# Patient Record
Sex: FEMALE | Race: WHITE | NOT HISPANIC OR LATINO | Employment: PART TIME | URBAN - METROPOLITAN AREA
[De-identification: names, ages, dates, MRNs, and addresses within clinical notes are randomized per-mention and may not be internally consistent; named-entity substitution may affect disease eponyms.]

---

## 2017-01-04 ENCOUNTER — TELEPHONE (OUTPATIENT)
Dept: SURGERY | Facility: CLINIC | Age: 42
End: 2017-01-04

## 2017-01-04 NOTE — TELEPHONE ENCOUNTER
Patient had gastric bypass 3/16.  She is calling today because she is wondering what her goal weight should be.    Discussed with patient that we don't set goal weights and her body will just eventually get to a set point and stop losing weight.  Asked patient what she thought her goal weight would be - she stated 140# which would put her at a 25.4 BMI.   She remembers her surgeon discussing a possible 150-160# possible weight goal which would put her at a 27.2-29 BMI.  She is currently at 158# a BMI of 28.7.  Any of those weights would be okay.  Discussed that she will attain the best weight loss by following the program.    She also had questions related to plastic surgery.  These were answered and list of plastic surgeons as well as information of body contouring were mailed to her.  She was encouraged to make appointment for her yearly follow up.   Cele Rubio, MS, RD, RN

## 2017-02-15 ENCOUNTER — TELEPHONE (OUTPATIENT)
Dept: SURGERY | Facility: CLINIC | Age: 42
End: 2017-02-15

## 2017-02-15 DIAGNOSIS — K91.2 POSTSURGICAL MALABSORPTION: ICD-10-CM

## 2017-02-15 DIAGNOSIS — Z98.84 BARIATRIC SURGERY STATUS: ICD-10-CM

## 2017-02-15 NOTE — TELEPHONE ENCOUNTER
Patient called to request orders be placed for her yearly appointment with Dr. Alvarez on 3/9/17.  She is 1 year PO gastric bypass.  Orders placed per protocol.  Patient notified.  Cele Rubio MS, RD, RN

## 2017-02-27 DIAGNOSIS — Z98.84 BARIATRIC SURGERY STATUS: ICD-10-CM

## 2017-02-27 DIAGNOSIS — K91.2 POSTSURGICAL MALABSORPTION: ICD-10-CM

## 2017-02-27 LAB
ERYTHROCYTE [DISTWIDTH] IN BLOOD BY AUTOMATED COUNT: 11.7 % (ref 10–15)
FERRITIN SERPL-MCNC: 184 NG/ML (ref 12–150)
HCT VFR BLD AUTO: 39.1 % (ref 35–47)
HGB BLD-MCNC: 13.5 G/DL (ref 11.7–15.7)
IRON SATN MFR SERPL: 40 % (ref 15–46)
IRON SERPL-MCNC: 107 UG/DL (ref 35–180)
MCH RBC QN AUTO: 33.1 PG (ref 26.5–33)
MCHC RBC AUTO-ENTMCNC: 34.5 G/DL (ref 31.5–36.5)
MCV RBC AUTO: 96 FL (ref 78–100)
PLATELET # BLD AUTO: 294 10E9/L (ref 150–450)
PTH-INTACT SERPL-MCNC: 35 PG/ML (ref 12–72)
RBC # BLD AUTO: 4.08 10E12/L (ref 3.8–5.2)
TIBC SERPL-MCNC: 266 UG/DL (ref 240–430)
VIT B12 SERPL-MCNC: 420 PG/ML (ref 193–986)
WBC # BLD AUTO: 6.8 10E9/L (ref 4–11)

## 2017-02-27 PROCEDURE — 83540 ASSAY OF IRON: CPT | Performed by: SURGERY

## 2017-02-27 PROCEDURE — 83550 IRON BINDING TEST: CPT | Performed by: SURGERY

## 2017-02-27 PROCEDURE — 85027 COMPLETE CBC AUTOMATED: CPT | Performed by: SURGERY

## 2017-02-27 PROCEDURE — 82607 VITAMIN B-12: CPT | Performed by: SURGERY

## 2017-02-27 PROCEDURE — 82728 ASSAY OF FERRITIN: CPT | Performed by: SURGERY

## 2017-02-27 PROCEDURE — 36415 COLL VENOUS BLD VENIPUNCTURE: CPT | Performed by: SURGERY

## 2017-02-27 PROCEDURE — 82306 VITAMIN D 25 HYDROXY: CPT | Performed by: SURGERY

## 2017-02-27 PROCEDURE — 83970 ASSAY OF PARATHORMONE: CPT | Performed by: SURGERY

## 2017-02-28 LAB — DEPRECATED CALCIDIOL+CALCIFEROL SERPL-MC: 63 UG/L (ref 20–75)

## 2017-03-06 ENCOUNTER — OFFICE VISIT (OUTPATIENT)
Dept: FAMILY MEDICINE | Facility: CLINIC | Age: 42
End: 2017-03-06
Payer: COMMERCIAL

## 2017-03-06 VITALS
HEIGHT: 62 IN | TEMPERATURE: 97.4 F | SYSTOLIC BLOOD PRESSURE: 124 MMHG | BODY MASS INDEX: 27.97 KG/M2 | HEART RATE: 78 BPM | DIASTOLIC BLOOD PRESSURE: 79 MMHG | WEIGHT: 152 LBS | OXYGEN SATURATION: 95 %

## 2017-03-06 DIAGNOSIS — L30.4 INTERTRIGO: Primary | ICD-10-CM

## 2017-03-06 DIAGNOSIS — Z98.84 S/P BARIATRIC SURGERY: ICD-10-CM

## 2017-03-06 DIAGNOSIS — L98.7 EXCESS SKIN OF ABDOMINAL WALL: ICD-10-CM

## 2017-03-06 PROCEDURE — 99213 OFFICE O/P EST LOW 20 MIN: CPT | Performed by: FAMILY MEDICINE

## 2017-03-06 RX ORDER — NYSTATIN 100000 [USP'U]/G
POWDER TOPICAL 3 TIMES DAILY PRN
Qty: 60 G | Refills: 1 | Status: SHIPPED | OUTPATIENT
Start: 2017-03-06 | End: 2018-03-12

## 2017-03-06 NOTE — NURSING NOTE
"Chief Complaint   Patient presents with     Derm Problem     skin irritation X 3 months       Initial /79 (BP Location: Left arm, Patient Position: Chair, Cuff Size: Adult Regular)  Pulse 78  Temp 97.4  F (36.3  C) (Oral)  Ht 5' 2\" (1.575 m)  Wt 152 lb (68.9 kg)  SpO2 95%  BMI 27.8 kg/m2 Estimated body mass index is 27.8 kg/(m^2) as calculated from the following:    Height as of this encounter: 5' 2\" (1.575 m).    Weight as of this encounter: 152 lb (68.9 kg).  Medication Reconciliation: complete   Kandi Carty MA      "

## 2017-03-06 NOTE — MR AVS SNAPSHOT
After Visit Summary   3/6/2017    Monica Mcgee    MRN: [id number]           Patient Information     Date Of Birth          1975        Visit Information        Provider Department      3/6/2017 10:30 AM Shaila Flores MD [facility]3] [place]        Today's Diagnoses     Intertrigo    -  1       Follow-ups after your visit        Follow-up notes from your care team     Return if symptoms worsen or fail to improve.      Your next 10 appointments already scheduled     Mar 09, 2017  1:30 PM CST   Annual Visit with Priscilla Ugalde 3, RD   [facility] ([facility])    [address] [place] [id number] [phone]            Mar 09, 2017  2:00 PM CST   Annual Visit with Antoine Alvarez MD   [facility] [place] ([facility])    [address] [place] [id number] [phone]              Who to contact     Normal or non-critical lab and imaging results will be communicated to you by FRESShart, letter or phone within 4 business days after the clinic has received the results. If you do not hear from us within 7 days, please contact the clinic through FRESShart or phone. If you have a critical or abnormal lab result, we will notify you by phone as soon as possible.  Submit refill requests through Socialinus or call your pharmacy and they will forward the refill request to us. Please allow 3 business days for your refill to be completed.          If you need to speak with a  for additional information , please call: [phone]             Additional Information About Your Visit        Socialinus Information     Socialinus gives you secure access to your electronic health record. If you see a primary care provider, you can also send messages to your care team and make appointments. If you have questions, please call your primary care  "clinic.  If you do not have a primary care provider, please call [phone] and they will assist you.        Care EveryWhere ID     This is your Care EveryWhere ID. This could be used by other organizations to access your [facility] medical records  [id number]        Your Vitals Were     Pulse Temperature Height Pulse Oximetry BMI (Body Mass Index)       78 97.4  F (36.3  C) (Oral) 5' 2\" (1.575 m) 95% 27.8 kg/m2        Blood Pressure from Last 3 Encounters:   03/06/17 124/79   09/12/16 122/86   06/23/16 117/80    Weight from Last 3 Encounters:   03/06/17 152 lb (68.9 kg)   09/12/16 177 lb (80.3 kg)   06/23/16 194 lb 12.8 oz (88.4 kg)              Today, you had the following     No orders found for display         Today's Medication Changes          These changes are accurate as of: 3/6/17 11:01 AM.  If you have any questions, ask your nurse or doctor.               Start taking these medicines.        Dose/Directions    nystatin 503470 UNIT/GM Powd   Commonly known as:  MYCOSTATIN   Used for:  Intertrigo   Started by:  Shaila Flores MD        Apply topically 3 times daily as needed   Quantity:  60 g   Refills:  1            Where to get your medicines      These medications were sent to [facility] - [address] [facility]  [address] [place] [address] [place] [address]     Phone:  [phone]     nystatin [id number] UNIT/GM Powd                Primary Care Provider Office Phone # Fax #    Shaila Flores [phone]       [facility] [address]  [place] [address]        Thank you!     Thank you for choosing [facility]  for your care. Our goal is always to provide you with excellent care. Hearing back from our patients is one way we can continue to improve our services. Please take a few minutes to complete the written survey that you may receive in the mail after your visit with us. Thank you!             Your Updated Medication List - " "Protect others around you: Learn how to safely use, store and throw away your medicines at www.disposemymeds.org.          This list is accurate as of: 3/6/17 11:01 AM.  Always use your most recent med list.                   Brand Name Dispense Instructions for use    CITRACAL +D3 PO      Take 600 mg by mouth 2 times daily       COLACE PO      Take by mouth 2 times daily       cyanocobalamin 1000 MCG/ML injection    VITAMIN B12    3 mL    Inject 1 mL (1,000 mcg) Subcutaneous every 30 days       etonogestrel 68 MG Impl    IMPLANON/NEXPLANON     1 each by Subdermal route once       multivitamin  peds with iron 60 MG chewable tablet      Take 2 chew tab by mouth daily (with lunch)       nystatin 910121 UNIT/GM Powd    MYCOSTATIN    60 g    Apply topically 3 times daily as needed       syringe/needle (disp) 25G X 1\" 3 ML Misc     3 each    Use for B-12 injection       VITAMIN D3 PO      Take 1,000 Units by mouth 2 times daily         "

## 2017-03-06 NOTE — PROGRESS NOTES
SUBJECTIVE:                                                    Monica Mcgee is a 41 year old female who presents to clinic today for the following health issues:        Rash     Onset: 3 months    Description:   Location: abdomen   Character: blotchy, odor  Itching (Pruritis): YES    Progression of Symptoms:  worsening    Accompanying Signs & Symptoms:  Fever: no   Body aches or joint pain: no   Sore throat symptoms: no   Recent cold symptoms: no    History:   Previous similar rash: YES    Precipitating factors:   Exposure to similar rash: no   New exposures: None   Recent travel: no     Alleviating factors:  none     Therapies Tried and outcome: none      Patient reports concern about excess abdominal skin after her bariatric surgery. Has lost about 80 lbs. States that her goal weight loss is 100 lbs.   Planning on an excess skin removal procedure in the near future      Problem list and histories reviewed & adjusted, as indicated.  Additional history: as documented    Patient Active Problem List   Diagnosis     Hypertension     Hyperlipidemia LDL goal <100     Type 2 diabetes mellitus without complication (H)     Dysthymic disorder     Nexplanon in place     Bariatric surgery status     External hemorrhoids with complication     Obesity (BMI 30-39.9)     Postsurgical malabsorption     Past Surgical History   Procedure Laterality Date      section  , 2001     x 2     Laparoscopic bypass gastric N/A 3/9/2016     Procedure: LAPAROSCOPIC BYPASS GASTRIC;  Surgeon: Antoine Alvarez MD;  Location:  OR     Laparoscopic herniorrhaphy hiatal N/A 3/9/2016     Procedure: LAPAROSCOPIC HERNIORRHAPHY HIATAL;  Surgeon: Antoine Alvarez MD;  Location:  OR       Social History   Substance Use Topics     Smoking status: Never Smoker     Smokeless tobacco: Not on file     Alcohol use No     Family History   Problem Relation Age of Onset     DIABETES Mother      Hypertension Mother       "Hyperlipidemia Mother      Depression Mother      Anxiety Disorder Mother      Obesity Mother      Breast Cancer Mother 42     Double mastectomy     DIABETES Father      Obesity Father      Anesthesia Reaction No family hx of          Current Outpatient Prescriptions   Medication Sig Dispense Refill     nystatin (MYCOSTATIN) 652820 UNIT/GM POWD Apply topically 3 times daily as needed 60 g 1     Docusate Sodium (COLACE PO) Take by mouth 2 times daily       cyanocobalamin (VITAMIN B12) 1000 MCG/ML injection Inject 1 mL (1,000 mcg) Subcutaneous every 30 days 3 mL 3     syringe/needle, disp, 25G X 1\" 3 ML MISC Use for B-12 injection 3 each 3     multivitamin  peds with iron (FLINTSTONES COMPLETE) 60 MG chewable tablet Take 2 chew tab by mouth daily (with lunch)        Cholecalciferol (VITAMIN D3 PO) Take 1,000 Units by mouth 2 times daily        Calcium-Phosphorus-Vitamin D (CITRACAL +D3 PO) Take 600 mg by mouth 2 times daily        etonogestrel (IMPLANON/NEXPLANON) 68 MG IMPL 1 each by Subdermal route once       No Known Allergies    Reviewed and updated as needed this visit by clinical staff  Tobacco  Allergies  Meds  Med Hx  Surg Hx  Fam Hx  Soc Hx      Reviewed and updated as needed this visit by Provider         ROS:  Constitutional, HEENT, cardiovascular, pulmonary, gi and gu systems are negative, except as otherwise noted.    OBJECTIVE:                                                    /79 (BP Location: Left arm, Patient Position: Chair, Cuff Size: Adult Regular)  Pulse 78  Temp 97.4  F (36.3  C) (Oral)  Ht 5' 2\" (1.575 m)  Wt 152 lb (68.9 kg)  SpO2 95%  BMI 27.8 kg/m2  Body mass index is 27.8 kg/(m^2).  GENERAL: healthy, alert and no distress  SKIN:  Excess skin of the abdominal wall. Intertrigo of the abdominal-inguinal fold. No skin breakdown.    Diagnostic Test Results:  none      ASSESSMENT/PLAN:                                                    Monica was seen today for derm " problem.    Diagnoses and all orders for this visit:    Intertrigo  -     nystatin (MYCOSTATIN) 461023 UNIT/GM POWD; Apply topically 3 times daily as needed    Excess skin of abdominal wall S/P bariatric surgery  Patient planning on an excess skin removal procedure in the near future.          Follow up if symptoms fail to improve or worsen.      The patient was in agreement with the plan today and had no questions or concerns prior to leaving the clinic.        Shaila Flores MD  [facility]

## 2017-03-09 ENCOUNTER — OFFICE VISIT (OUTPATIENT)
Dept: SURGERY | Facility: CLINIC | Age: 42
End: 2017-03-09
Payer: COMMERCIAL

## 2017-03-09 VITALS
OXYGEN SATURATION: 99 % | RESPIRATION RATE: 16 BRPM | WEIGHT: 153.2 LBS | BODY MASS INDEX: 28.02 KG/M2 | SYSTOLIC BLOOD PRESSURE: 129 MMHG | HEART RATE: 76 BPM | DIASTOLIC BLOOD PRESSURE: 87 MMHG

## 2017-03-09 DIAGNOSIS — Z98.84 BARIATRIC SURGERY STATUS: Primary | ICD-10-CM

## 2017-03-09 DIAGNOSIS — Z98.84 BARIATRIC SURGERY STATUS: ICD-10-CM

## 2017-03-09 PROCEDURE — 99214 OFFICE O/P EST MOD 30 MIN: CPT | Performed by: SURGERY

## 2017-03-09 PROCEDURE — 97803 MED NUTRITION INDIV SUBSEQ: CPT | Performed by: DIETITIAN, REGISTERED

## 2017-03-09 NOTE — MR AVS SNAPSHOT
MRN:[id number]                      After Visit Summary   3/9/2017    Monica Mcgee    MRN: [id number]           Visit Information        Provider Department      3/9/2017 1:30 PM 3, Sh William Ugalde RD [facility] [place] [facility]      MyChart Information     [employer] Medicalhart gives you secure access to your electronic health record. If you see a primary care provider, you can also send messages to your care team and make appointments. If you have questions, please call your primary care clinic.  If you do not have a primary care provider, please call [phone] and they will assist you.        Care EveryWhere ID     This is your Care EveryWhere ID. This could be used by other organizations to access your [facility] medical records  [id number]

## 2017-03-09 NOTE — LETTER
2017    RE:  Monica BANKSSean Ferchomelissa-:  [birth year]    Patient presents an annual follow-up after undergoing laparoscopic Halima-en-Y gastric bypass. She overall states that she is doing very well and she seems very pleased at this time. She continues to follow her lifestyle modifications with the exception of occasional snacking in between meals. He continues to not drink with her meals. She reports ongoing sense of restriction with meals. She denies nausea or vomiting. She denies episodes of food getting stuck. She is not aware of any specific foods that she is unable to tolerate. Her weight at today's visit was 153 pounds and her BMI was 28.02. Her home medications were reviewed. She basically is only taking her prescribed supplements which she states that she is taking on a regular and scheduled basis. Her only real ongoing difficulty is that of intertriginous skin fold rashes. As she has lost weight she has developed a modest amount of redundant lower abdominal skin resulting in Ongoing difficulties with rashes. She continues to treat this with Mycostatin powder. She is exercising on a regular basis.     On examination: Her abdomen is benign. She has a modest hanging pannus resulting in some intertriginous skinfold rash in the lower abdomen.. There is no evidence of cellulitis or panniculitis. There is no evidence of incisional hernia.     Overall this patient is doing exceptionally well. After undergoing her Halima-en-Y gastric bypass she has seen resolution of her prediabetes, acid reflux disease, hypertension, and Stress urinary incontinence. Basically all of her preoperative medical conditions relative to her morbid obesity have improved. I believe that she at some point in the future is going to need skin removal surgery due to these ongoing issues of skin fold rashes. I reviewed her yearly laboratories which all looked good. We discussed the necessity for ongoing lifestyle modification And adherence to her  dietary restrictions. This was all discussed with her in great detail. Her questions were all answered. I will see her again in annual follow-up in one year.     Antoine Alvarez MD

## 2017-03-09 NOTE — PROGRESS NOTES
"POST-OPERATIVE NUTRITION APPOINTMENT  DATE OF VISIT: 3/9/2017  Name: ALEX POPE  : 1975  Gender: Female  MRN: [id number]  Age: 41    ASSESSMENT:  REASON FOR VISIT:  ALEX POPE is a 41 year old Female presents today for 1 year PO nutrition follow-up appointment.  DIAGNOSIS:  Status post Laparoscopic Halima-en-Y Gastric Bypass surgery.  ANTHROPOMETRICS:  Height: 62.25 inches  Weight: 153 lbs  BMI: 27.8 kg/m2  VITAMINS AND MINERALS:  Multivitamin - 2 York's Complete, daily   Calcium - 600mg BID   Vitamin D - 2000 international units,daily   Vitamin B-12 - monthly injections   No iron needed per clinic guidelines   NUTRITION HISTORY:  Breakfast: hard boiled or fried egg with spinach and cheese, may skip breakfast 2x/week   Lunch: 2 slices of ham w/cream cheese (rolls), macadamia nuts   Supper: seafood fozia w/scallops, shrimp and mushrooms - uses shiratake noodles  Snacks: wasabi peas 1-2x/day, low carb ice cream   Beverages:water - 60-64oz  Consuming liquid calories: none  Protein intake: 50-60 grams/day  Tolerate regular texture food: Yes  Any foods not tolerated details: None  Portion size: 1 cup  Take 30 minutes to consume each meal: No  Eat protein foods first: Yes  Fluids and meals separate by at least 30 minutes: No  Chew foods 20 plus times: No  Tolerating diet: bariatric regular diet  Drinking high protein supplements/shakes: Yes  Consuming meals per day: 3  Consuming snacks per day: 1-2  Comment: Pt happy with weight loss but aims for an ultimate goal of 130 lbs. Discussed getting \"back to basics\" regarding dietary guidelines for optimal success.     PHYSICAL ACTIVITY:  Type: treadmill  Frequency: 2-3 times a week  Duration (min): 30  DIAGNOSIS:  Previous Nutrition Diagnosis: Altered gastrointestinal function related to alteration in gastrointestinal structure as evidenced by history of Laparoscopic Halima-en-Y Gastric Bypass surgery.  Unchanged, modified below.   Previous goals:  Add 2-3 " prunes daily into diet - not met   Add fruit and vegetable daily to meals - improving   Continue exercising 4 times per week -not met  Current Nutrition Diagnosis: Altered gastrointestinal function related to alteration in gastrointestinal structure as evidenced by history of Laparoscopic Halima-en-Y Gastric Bypass   Unchanged  INTERVENTION:  Nutrition Prescription: Eat 3 meals a day at regular intervals. Consume 60-90 grams of protein daily. Follow post-surgical vitamins and minerals protocol.  Goals:  Eliminate snacking   Do not exceed 1 cup per meal and limit high fat items like nuts, sauces and cheese.   Take 20 minutes to finish each meal   Separate fluids and meals by 30 minutes.   Exercise 4x/week.   Implementation: Discussed progress toward previous goals; reinforced importance of following bariatric lifestyle changes  NUTRITION MONITORING AND EVALUATION:  Anticipated compliance: Good  Verbalized understanding by discussing sticking to bariatric guidelines for long-term success.     Follow up: Continue to monitor pt closely regarding wt loss and diet,   Patient to follow up in 1 year.  TIME SPENT WITH PATIENT:  25 minutes  Shirley Dempsey RD, LD  Clinical Dietitian

## 2017-03-09 NOTE — MR AVS SNAPSHOT
After Visit Summary   3/9/2017    Monica Mcgee    MRN: [id number]           Patient Information     Date Of Birth          1975        Visit Information        Provider Department      3/9/2017 2:00 PM Antoine Alvarez MD [facility]      Today's Diagnoses     Bariatric surgery status    -  1    BMI 28.0-28.9,adult           Follow-ups after your visit        Who to contact     If you have questions or need follow up information about today's clinic visit or your schedule please contact [facility] directly at [phone].  Normal or non-critical lab and imaging results will be communicated to you by reportbrainhart, letter or phone within 4 business days after the clinic has received the results. If you do not hear from us within 7 days, please contact the clinic through reportbrainhart or phone. If you have a critical or abnormal lab result, we will notify you by phone as soon as possible.  Submit refill requests through Glassy Pro or call your pharmacy and they will forward the refill request to us. Please allow 3 business days for your refill to be completed.          Additional Information About Your Visit        MyChart Information     Gl[employer] Pro gives you secure access to your electronic health record. If you see a primary care provider, you can also send messages to your care team and make appointments. If you have questions, please call your primary care clinic.  If you do not have a primary care provider, please call [phone] and they will assist you.        Care EveryWhere ID     This is your Care EveryWhere ID. This could be used by other organizations to access your [facility] medical records  [id number]        Your Vitals Were     Pulse Respirations Pulse Oximetry BMI (Body Mass Index)          76 16 99% 28.02 kg/m2         Blood Pressure from Last 3 Encounters:   03/09/17 129/87  "  03/06/17 124/79   09/12/16 122/86    Weight from Last 3 Encounters:   03/09/17 153 lb 3.2 oz (69.5 kg)   03/06/17 152 lb (68.9 kg)   09/12/16 177 lb (80.3 kg)              We Performed the Following     OP ROOMING NOTE TO SHARLA        Primary Care Provider Office Phone # Fax #    Shaila Flores [phone]       [facility] [place] [address]  [place] [address]        Thank you!     Thank you for choosing [facility]  for your care. Our goal is always to provide you with excellent care. Hearing back from our patients is one way we can continue to improve our services. Please take a few minutes to complete the written survey that you may receive in the mail after your visit with us. Thank you!             Your Updated Medication List - Protect others around you: Learn how to safely use, store and throw away your medicines at www.disposemymeds.org.          This list is accurate as of: 3/9/17 11:59 PM.  Always use your most recent med list.                   Brand Name Dispense Instructions for use    CITRACAL +D3 PO      Take 600 mg by mouth 2 times daily       COLACE PO      Take by mouth 2 times daily       cyanocobalamin 1000 MCG/ML injection    VITAMIN B12    3 mL    Inject 1 mL (1,000 mcg) Subcutaneous every 30 days       etonogestrel 68 MG Impl    IMPLANON/NEXPLANON     1 each by Subdermal route once       FISH OIL PO          multivitamin  peds with iron 60 MG chewable tablet      Take 2 chew tab by mouth daily (with lunch)       nystatin [id number] UNIT/GM Powd    MYCOSTATIN    60 g    Apply topically 3 times daily as needed       syringe/needle (disp) 25G X 1\" 3 ML Misc     3 each    Use for B-12 injection       VITAMIN D3 PO      Take 1,000 Units by mouth 2 times daily         "

## 2017-03-16 NOTE — PROGRESS NOTES
Patient presents an annual follow-up after Undergoing laparoscopic Halima-en-Y gastric bypass.  She overall states that she is doing very well and she seems very pleased at this time.  She continues to follow her lifestyle modifications with the exception of occasional snacking in between meals.  He continues to not drink with her meals.  She reports ongoing sense of restriction with meals.  She denies nausea or vomiting.  She denies episodes of food getting stuck.  She is not aware of any specific foods that she is unable to tolerate.  Her weight at today's visit was 153 pounds and her BMI was 28.02. Her home medications were reviewed.  She basically is only taking her prescribed supplements which she states that she is taking on a regular and scheduled basis.   Her only real ongoing difficulty is that of intertriginous skin fold rashes.  As she has lost weight she has developed a modest amount of redundant lower abdominal skin resulting in  Ongoing difficulties with rashes.  She continues to treat this with Mycostatin powder. She is exercising on a regular basis.      On examination: Her abdomen is benign.  She has a modest hanging pannus resulting in some intertriginous skinfold rash in the lower abdomen..  There is no evidence of cellulitis or panniculitis. There is no evidence of incisional hernia.    Overall this patient is doing exceptionally well.  After undergoing her Halima-en-Y gastric bypass she has seen resolution of her prediabetes, acid reflux disease, hypertension, and Stress urinary incontinence. Basically all of her preoperative medical conditions relative to her morbid obesity have improved.  I believe that she at some point in the future is going to need skin removal surgery due to these ongoing issues of skin fold rashes. I reviewed her yearly laboratories which all looked good.   We discussed the necessity for ongoing lifestyle modification  And adherence to her dietary restrictions.  This was all  discussed with her in great Detail.  Her questions were all answered.  I will see her again in annual follow-up in one year.  Total time spent was 25 minutes with greater than 50% in face-to-face consultation.    Please route or send letter to:  Primary Care Provider (PCP) and Referring Provider

## 2017-03-27 ENCOUNTER — TELEPHONE (OUTPATIENT)
Dept: FAMILY MEDICINE | Facility: CLINIC | Age: 42
End: 2017-03-27

## 2017-03-27 DIAGNOSIS — L30.4 INTERTRIGO: Primary | ICD-10-CM

## 2017-03-27 NOTE — TELEPHONE ENCOUNTER
Nystain medication is not working for patient, would like to discuss alternatives, please call, okay to leave message.

## 2017-03-28 NOTE — TELEPHONE ENCOUNTER
Please clarify from patient: Does she have an active rash in her skin folds at this time or is it the body-odor only?

## 2017-03-28 NOTE — TELEPHONE ENCOUNTER
Pt states she does not have active red rash now , however the odor never goes away , she feels the powder just rubs of during her work out . She states she will wash area with soap and water , towel dry , and allow for a 15 min air dry .However the rash just comes back .She states she knows she needs removal but what can she do until then ?

## 2017-03-29 RX ORDER — KETOCONAZOLE 20 MG/G
CREAM TOPICAL 2 TIMES DAILY
Qty: 15 G | Refills: 0 | Status: SHIPPED | OUTPATIENT
Start: 2017-03-29 | End: 2018-03-12

## 2017-03-29 NOTE — TELEPHONE ENCOUNTER
It's difficult to treat the odor as it's from the trapped moisture from the skin folds.   We could try an antifungal cream to see if it will helps . Also could try a different OTC talcum powder to prevent moisture/wetness like the gold bond powder.  Rx sent.   Keep me updated on which works best.

## 2017-03-29 NOTE — TELEPHONE ENCOUNTER
Patient informed of RX and OTC powder.  Patient stated she will try the cream and will update us later on how cream is working.  Patient had no further concerns.

## 2017-03-29 NOTE — TELEPHONE ENCOUNTER
Left message on voice mail for patient to call clinic. [phone]  Shelley Glover RN

## 2017-03-30 DIAGNOSIS — E66.01 MORBID OBESITY WITH BMI OF 40.0-44.9, ADULT (H): Chronic | ICD-10-CM

## 2017-03-30 DIAGNOSIS — Z98.84 BARIATRIC SURGERY STATUS: ICD-10-CM

## 2017-03-30 RX ORDER — NEEDLES, FILTER 19GX1 1/2"
NEEDLE, DISPOSABLE MISCELLANEOUS
Qty: 3 EACH | Refills: 3 | Status: SHIPPED | OUTPATIENT
Start: 2017-03-30 | End: 2018-03-12

## 2017-03-30 RX ORDER — CYANOCOBALAMIN 1000 UG/ML
1 INJECTION, SOLUTION INTRAMUSCULAR; SUBCUTANEOUS
Qty: 3 ML | Refills: 3 | Status: SHIPPED | OUTPATIENT
Start: 2017-03-30 | End: 2018-03-12

## 2017-04-27 ENCOUNTER — TELEPHONE (OUTPATIENT)
Dept: FAMILY MEDICINE | Facility: CLINIC | Age: 42
End: 2017-04-27

## 2017-04-27 DIAGNOSIS — E65 ABDOMINAL PANNUS: ICD-10-CM

## 2017-04-27 DIAGNOSIS — L30.4 INTERTRIGO: Primary | ICD-10-CM

## 2017-04-27 NOTE — TELEPHONE ENCOUNTER
Patient notified and voiced understanding and agreement.  Referral number given.  Shelley Glover RN

## 2017-04-27 NOTE — TELEPHONE ENCOUNTER
Spoke with patient and she reports she has been using the ketoconazole cream, no powder at this time, and the cream is not helping.  The odor remains along with raised red bumps in area and this itches.  She reports this is the same as it has been, no new symptoms.  Please advise on next step.  Shaila Aburto RN, MD 3/27/17 telephone encounter  Note      It's difficult to treat the odor as it's from the trapped moisture from the skin folds.   We could try an antifungal cream to see if it will helps . Also could try a different OTC talcum powder to prevent moisture/wetness like the gold bond powder.  Rx sent.   Keep me updated on which works best.

## 2017-05-24 ENCOUNTER — TELEPHONE (OUTPATIENT)
Dept: SURGERY | Facility: CLINIC | Age: 42
End: 2017-05-24

## 2017-05-24 NOTE — TELEPHONE ENCOUNTER
"Patient is 14+ months PO gastric bypass.  She saw Dr. Alvarez 3/9/17 for her yearly PO visit.  At that time she complained of intertriginous skin fold rashes die to a modest amount of redundant lower abdominal skin from weight loss. This has resulted in ongoing difficulties with rashes.  She treats this with Mycostatin powder.  Her abdominal exam indicated: \"a modest hanging pannus resulting in some intertriginous skinfold rash in the lower abdomen\".  At that visit it was also discussed that she would \"need skin removal surgery due to these ongoing issues of skin fold rashes\".    Patient is calling today because she is requesting a referral to [facility] - either Dr. Nagy or Espinoza.   Informed her that we don't normally do this as there is no need; however I would be happy to let them know that  is recommending them.  Recommended patient keep record of visits to PCP and our clinic re: her skin breakdown and seek treatment.  Patient verbalized understanding and is agreeable to plan.  Cele Rubio MS, RD, RN      Called [facility].  Informed them re: above.  Was told they don't need patients name.  They will just get it when she calls and no referral needed.  Patient called and informed re: above and that [facility] no longer has an office in [place].  LM on patient's VM re: above.  Cele Rubio MS, RD, RN    "

## 2017-07-31 ENCOUNTER — TELEPHONE (OUTPATIENT)
Dept: FAMILY MEDICINE | Facility: CLINIC | Age: 42
End: 2017-07-31

## 2017-07-31 NOTE — TELEPHONE ENCOUNTER
Pt will call back to schedule. Call X1 week to check if she would like appointment if not already scheduled.

## 2017-07-31 NOTE — TELEPHONE ENCOUNTER
Panel Management Review      Patient has the following on her problem list:     Diabetes    ASA: Passed    Last A1C  Lab Results   Component Value Date    A1C 5.0 06/09/2016    A1C 6.8 03/10/2016    A1C 7.9 12/17/2015    A1C 6.1 05/27/2015    A1C 5.8 08/08/2014     A1C tested: Passed    Last LDL:    Lab Results   Component Value Date    CHOL 186 05/27/2015     Lab Results   Component Value Date    HDL 40 05/27/2015     Lab Results   Component Value Date     05/27/2015     Lab Results   Component Value Date    TRIG 145 05/27/2015     No results found for: CHOLHDLRATIO  No results found for: NHDL    Is the patient on a Statin? NO             Is the patient on Aspirin? NO        Last three blood pressure readings:  BP Readings from Last 3 Encounters:   03/09/17 129/87   03/06/17 124/79   09/12/16 122/86       Date of last diabetes office visit: 6/2016     Tobacco History:     History   Smoking Status     Never Smoker   Smokeless Tobacco     Not on file             Composite cancer screening  Chart review shows that this patient is due/due soon for the following Pap Smear  Summary:    Patient is due/failing the following:   A1C, FOLLOW UP diabetes, PAP and PHYSICAL    Action needed:   Patient needs office visit for annual physical with pap smear and fasting labs.    Type of outreach:    Phone, left message for patient to call back.     Questions for provider review:    None                                                                                                                                    Katerin Doss MA       Chart routed to Care Team .

## 2017-08-12 ENCOUNTER — HEALTH MAINTENANCE LETTER (OUTPATIENT)
Age: 42
End: 2017-08-12

## 2017-08-28 ENCOUNTER — TELEPHONE (OUTPATIENT)
Dept: FAMILY MEDICINE | Facility: CLINIC | Age: 42
End: 2017-08-28

## 2017-08-28 NOTE — TELEPHONE ENCOUNTER
Patient calling is scheduled for a phy and wants to add time for removal of her Implanon. Please call to advise.

## 2017-08-28 NOTE — TELEPHONE ENCOUNTER
Dr. Flores does not remove Implanons. Dr. Agbeh can remove. Left message on voicemail for patient to return call

## 2017-08-28 NOTE — TELEPHONE ENCOUNTER
Patient returned call, advised patient Dr. Flores does not remove Implanon. Patient scheduled appointment with Dr. Agbeh 9/14/17 for physical and removal of Implanon

## 2017-09-11 ENCOUNTER — OFFICE VISIT (OUTPATIENT)
Dept: FAMILY MEDICINE | Facility: CLINIC | Age: 42
End: 2017-09-11
Payer: COMMERCIAL

## 2017-09-11 VITALS
OXYGEN SATURATION: 100 % | HEART RATE: 66 BPM | WEIGHT: 152.6 LBS | SYSTOLIC BLOOD PRESSURE: 126 MMHG | DIASTOLIC BLOOD PRESSURE: 81 MMHG | HEIGHT: 62 IN | BODY MASS INDEX: 28.08 KG/M2 | TEMPERATURE: 98.6 F

## 2017-09-11 DIAGNOSIS — Z98.84 BARIATRIC SURGERY STATUS: ICD-10-CM

## 2017-09-11 DIAGNOSIS — E11.9 TYPE 2 DIABETES, DIET CONTROLLED (H): ICD-10-CM

## 2017-09-11 DIAGNOSIS — Z01.818 PREOP GENERAL PHYSICAL EXAM: Primary | ICD-10-CM

## 2017-09-11 DIAGNOSIS — L98.7 EXCESS SKIN: ICD-10-CM

## 2017-09-11 DIAGNOSIS — E65 ABDOMINAL PANNUS: ICD-10-CM

## 2017-09-11 DIAGNOSIS — Z13.220 LIPID SCREENING: ICD-10-CM

## 2017-09-11 PROCEDURE — 99214 OFFICE O/P EST MOD 30 MIN: CPT | Performed by: FAMILY MEDICINE

## 2017-09-11 NOTE — MR AVS SNAPSHOT
After Visit Summary   9/11/2017    Monica Mcgee    MRN: [id number]           Patient Information     Date Of Birth          1975        Visit Information        Provider Department      9/11/2017 4:00 PM Shaila Flores MD [facility]        Today's Diagnoses     Preop general physical exam    -  1    Bariatric surgery status        Abdominal pannus        Type 2 diabetes, diet controlled (H)          Care Instructions      Before Your Surgery      Call your surgeon if there is any change in your health. This includes signs of a cold or flu (such as a sore throat, runny nose, cough, rash or fever).    Do not smoke, drink alcohol or take over the counter medicine (unless your surgeon or primary care doctor tells you to) for the 24 hours before and after surgery.    If you take prescribed drugs: Follow your doctor s orders about which medicines to take and which to stop until after surgery.    Eating and drinking prior to surgery: follow the instructions from your surgeon    Take a shower or bath the night before surgery. Use the soap your surgeon gave you to gently clean your skin. If you do not have soap from your surgeon, use your regular soap. Do not shave or scrub the surgery site.  Wear clean pajamas and have clean sheets on your bed.           Follow-ups after your visit        Your next 10 appointments already scheduled     Sep 14, 2017  4:00 PM CDT   Office Visit with Cephas Mawuena Agbeh, MD   [facility] ([facility] [place])    [address] [facility] [id number] [phone]           Bring a current list of meds and any records pertaining to this visit. For Physicals, please bring immunization records and any forms needing to be filled out. Please arrive 10 minutes early to complete paperwork.              Future tests that were ordered for you today     Open Future Orders        Priority Expected Expires Ordered    CBC with platelets  "Routine  12/11/2017 9/11/2017    Basic metabolic panel  (Ca, Cl, CO2, Creat, Gluc, K, Na, BUN) Routine  12/11/2017 9/11/2017    Albumin Random Urine Quantitative with Creat Ratio Routine  12/11/2017 9/11/2017    **A1C FUTURE anytime Routine 9/11/2017 12/11/2017 9/11/2017    **TSH with free T4 reflex FUTURE anytime Routine 9/11/2017 12/11/2017 9/11/2017            Who to contact     Normal or non-critical lab and imaging results will be communicated to you by Examifyt, letter or phone within 4 business days after the clinic has received the results. If you do not hear from us within 7 days, please contact the clinic through [employer] or phone. If you have a critical or abnormal lab result, we will notify you by phone as soon as possible.  Submit refill requests through [employer] or call your pharmacy and they will forward the refill request to us. Please allow 3 business days for your refill to be completed.          If you need to speak with a  for additional information , please call: [phone]             Additional Information About Your Visit        [employer] Information     [employer] gives you secure access to your electronic health record. If you see a primary care provider, you can also send messages to your care team and make appointments. If you have questions, please call your primary care clinic.  If you do not have a primary care provider, please call [phone] and they will assist you.        Care EveryWhere ID     This is your Care EveryWhere ID. This could be used by other organizations to access your [place] medical records  [id number]        Your Vitals Were     Pulse Temperature Height Pulse Oximetry Breastfeeding? BMI (Body Mass Index)    66 98.6  F (37  C) (Tympanic) 5' 2\" (1.575 m) 100% No 27.91 kg/m2       Blood Pressure from Last 3 Encounters:   09/11/17 126/81   03/09/17 129/87   03/06/17 124/79    Weight from Last 3 Encounters:   09/11/17 152 lb 9.6 oz (69.2 kg)   03/09/17 " "153 lb 3.2 oz (69.5 kg)   03/06/17 152 lb (68.9 kg)               Primary Care Provider Office Phone # Fax #    Shaila Flores [phone]       [address] [place] [address]        Equal Access to Services     [facility]: Hadii aad ku hadasho Soomaali, waaxda luqadaha, qaybta kaalmada adeegyada, waxay idiin hayaan adeeg hilaria laanthonyn . So [place] [phone].    ATENCIÓN: Si habla español, tiene a dela cruz disposición servicios gratuitos de asistencia lingüística. [facility] [phone].    We comply with applicable federal civil rights laws and [place] laws. We do not discriminate on the basis of race, color, national origin, age, disability sex, sexual orientation or gender identity.            Thank you!     Thank you for choosing [facility]  for your care. Our goal is always to provide you with excellent care. Hearing back from our patients is one way we can continue to improve our services. Please take a few minutes to complete the written survey that you may receive in the mail after your visit with us. Thank you!             Your Updated Medication List - Protect others around you: Learn how to safely use, store and throw away your medicines at www.disposemymeds.org.          This list is accurate as of: 9/11/17  4:37 PM.  Always use your most recent med list.                   Brand Name Dispense Instructions for use Diagnosis    BD INTEGRA SYRINGE 25G X 1\" 3 ML Misc   Generic drug:  syringe/needle (disp)     3 each    USE FOR B-12 INJECTION    Morbid obesity with BMI of 40.0-44.9, adult (H), Bariatric surgery status       CITRACAL +D3 PO      Take 600 mg by mouth 2 times daily        COLACE PO      Take by mouth 2 times daily    Bariatric surgery status, Obesity (BMI 30.0-34.9)       cyanocobalamin 1000 MCG/ML injection    VITAMIN B12    3 mL    Inject 1 mL (1,000 mcg) Subcutaneous every 30 days    Morbid obesity with BMI of 40.0-44.9, adult (H), Bariatric surgery " status       etonogestrel 68 MG Impl    IMPLANON/NEXPLANON     1 each by Subdermal route once        FISH OIL PO           ketoconazole 2 % cream    NIZORAL    15 g    Apply topically 2 times daily    Intertrigo       multivitamin  peds with iron 60 MG chewable tablet      Take 2 chew tab by mouth daily (with lunch)        nystatin 970343 UNIT/GM Powd    MYCOSTATIN    60 g    Apply topically 3 times daily as needed    Intertrigo       VITAMIN D3 PO      Take 1,000 Units by mouth 2 times daily

## 2017-09-11 NOTE — NURSING NOTE
"Chief Complaint   Patient presents with     Pre-Op Exam       Initial /81  Pulse 66  Temp 98.6  F (37  C) (Tympanic)  Ht 5' 2\" (1.575 m)  Wt 152 lb 9.6 oz (69.2 kg)  SpO2 100%  Breastfeeding? No  BMI 27.91 kg/m2 Estimated body mass index is 27.91 kg/(m^2) as calculated from the following:    Height as of this encounter: 5' 2\" (1.575 m).    Weight as of this encounter: 152 lb 9.6 oz (69.2 kg).  Medication Reconciliation: complete       Katerin Doss MA      "

## 2017-09-11 NOTE — PROGRESS NOTES
[facility]  [address] [address] [facility]  [place] [address]  [phone]  Dept: [phone]    PRE-OP EVALUATION:  Today's date: 2017    Monica Mcgee (: 1975) presents for pre-operative evaluation assessment as requested by Dr. Bryan Parikh.  She requires evaluation and anesthesia risk assessment prior to undergoing surgery/procedure for treatment of Excess skin s/p bariatric surgery .  Proposed procedure: abdominal plasty, brachioplasty  and bilateral breast lift     Date of Surgery/ Procedure: 10/9/17  Time of Surgery/ Procedure: 8AM  Hospital/Surgical Facility: [facility]   Fax number for surgical facility: [phone]  Primary Physician: Shaila Flores  Type of Anesthesia Anticipated: General    Patient has a Health Care Directive or Living Will:  NO    1. NO - Do you have a history of heart attack, stroke, stent, bypass or surgery on an artery in the head, neck, heart or legs?  2. NO - Do you ever have any pain or discomfort in your chest?  3. NO - Do you have a history of  Heart Failure?  4. NO - Are you troubled by shortness of breath when: walking on the level, up a slight hill or at night?  5. NO - Do you currently have a cold, bronchitis or other respiratory infection?  6. NO - Do you have a cough, shortness of breath or wheezing?  7. NO - Do you sometimes get pains in the calves of your legs when you walk?  8. NO - Do you or anyone in your family have previous history of blood clots?  9. NO - Do you or does anyone in your family have a serious bleeding problem such as prolonged bleeding following surgeries or cuts?  10. NO - Have you ever had problems with anemia or been told to take iron pills?  11. NO - Have you had any abnormal blood loss such as black, tarry or bloody stools, or abnormal vaginal bleeding?  12. NO - Have you ever had a blood transfusion?  13. NO - Have you or any of your relatives ever had problems with anesthesia?  14. NO - Do you  have sleep apnea, excessive snoring or daytime drowsiness?  15. NO - Do you have any prosthetic heart valves?  16. NO - Do you have prosthetic joints?  17. NO - Is there any chance that you may be pregnant?        HPI:                                                      Brief HPI related to upcoming procedure:     42 year old pleasant female patient of mine here for a pre-op exam.     She is s/p bariatric surgery 18 months ago. Has lost about 91 lbs so far.   Now has excess skin in multiple areas- breast, upper arms and abdominal wall and wishes to proceed with an abdominoplasty, brachioplasty and breast lift.   States that she understands the risks and benefits of the procedure and wishes to proceed.     She has a history of type 2 diabetes that is now diet controlled since the bariatric surgery. Last A1C was 5.0 in 6/2016.  She is overdue for follow up labs to include lipid screening as well.     No other concerns today. No recent illnesses.       See problem list for active medical problems.  Problems all longstanding and stable, except as noted/documented.  See ROS for pertinent symptoms related to these conditions.                                                                                                  .    MEDICAL HISTORY:                                                    Patient Active Problem List    Diagnosis Date Noted     External hemorrhoids with complication 06/13/2016     Priority: Medium     Obesity (BMI 30-39.9) 06/13/2016     Priority: Medium     Postsurgical malabsorption 06/13/2016     Priority: Medium     Bariatric surgery status 03/16/2016     Priority: Medium     Nexplanon in place      Priority: Medium     Hyperlipidemia LDL goal <100 01/05/2016     Priority: Medium     Hypertension 07/31/2015     Priority: Medium     Type 2 diabetes mellitus without complication (H) 05/07/2014     Priority: Medium     Dysthymic disorder 05/07/2014     Priority: Medium      Past Medical History:  "  Diagnosis Date     Family history of breast cancer in mother      Hypertension      Morbid obesity (H)     in the process of undergoing bariatric surgery     Type 2 diabetes mellitus with hyperosmolarity without nonketotic hyperglycemic-hyperosmolar coma (nkhhc) (H)      Past Surgical History:   Procedure Laterality Date      SECTION  , 2001    x 2     LAPAROSCOPIC BYPASS GASTRIC N/A 3/9/2016    Procedure: LAPAROSCOPIC BYPASS GASTRIC;  Surgeon: Antoine Alvarez MD;  Location:  OR     LAPAROSCOPIC HERNIORRHAPHY HIATAL N/A 3/9/2016    Procedure: LAPAROSCOPIC HERNIORRHAPHY HIATAL;  Surgeon: Antoine Alvarez MD;  Location:  OR     Current Outpatient Prescriptions   Medication Sig Dispense Refill     BD INTEGRA SYRINGE 25G X 1\" 3 ML MISC USE FOR B-12 INJECTION 3 each 3     cyanocobalamin (VITAMIN B12) 1000 MCG/ML injection Inject 1 mL (1,000 mcg) Subcutaneous every 30 days 3 mL 3     Omega-3 Fatty Acids (FISH OIL PO)        Docusate Sodium (COLACE PO) Take by mouth 2 times daily       multivitamin  peds with iron (FLINTSTONES COMPLETE) 60 MG chewable tablet Take 2 chew tab by mouth daily (with lunch)        Cholecalciferol (VITAMIN D3 PO) Take 1,000 Units by mouth 2 times daily        Calcium-Phosphorus-Vitamin D (CITRACAL +D3 PO) Take 600 mg by mouth 2 times daily        etonogestrel (IMPLANON/NEXPLANON) 68 MG IMPL 1 each by Subdermal route once       ketoconazole (NIZORAL) 2 % cream Apply topically 2 times daily (Patient not taking: Reported on 2017) 15 g 0     nystatin (MYCOSTATIN) 348719 UNIT/GM POWD Apply topically 3 times daily as needed (Patient not taking: Reported on 2017) 60 g 1     OTC products: None, except as noted above    No Known Allergies   Latex Allergy: NO    Social History   Substance Use Topics     Smoking status: Never Smoker     Smokeless tobacco: Not on file     Alcohol use No     History   Drug Use No       REVIEW OF SYSTEMS:                               " "                     Constitutional, neuro, ENT, endocrine, pulmonary, cardiac, gastrointestinal, genitourinary, musculoskeletal, integument and psychiatric systems are negative, except as otherwise noted.      EXAM:                                                    /81  Pulse 66  Temp 98.6  F (37  C) (Tympanic)  Ht 5' 2\" (1.575 m)  Wt 152 lb 9.6 oz (69.2 kg)  SpO2 100%  Breastfeeding? No  BMI 27.91 kg/m2    GENERAL APPEARANCE: healthy, alert and no distress     EYES: EOMI, PERRL     HENT: ear canals and TM's normal and nose and mouth without ulcers or lesions     NECK: no adenopathy, no asymmetry, masses, or scars and thyroid normal to palpation     RESP: lungs clear to auscultation - no rales, rhonchi or wheezes     CV: regular rates and rhythm, normal S1 S2, no S3 or S4 and no murmur, click or rub     ABDOMEN:  soft, nontender, no HSM or masses and bowel sounds normal. Large abdominal pannus     MS: extremities normal- no gross deformities noted, no evidence of inflammation in joints, FROM in all extremities.     SKIN: no suspicious lesions or rashes     NEURO: Normal strength and tone, sensory exam grossly normal, mentation intact and speech normal     PSYCH: mentation appears normal. and affect normal/bright     LYMPHATICS: No axillary, cervical, or supraclavicular nodes    DIAGNOSTICS:                                                      Labs Drawn and in Process:   Unresulted Labs Ordered in the Past 30 Days of this Admission     No orders found from 7/13/2017 to 9/12/2017.          Recent Labs   Lab Test  02/27/17   1157  09/12/16   1425  06/09/16   1255  03/10/16   0537  03/09/16   1242  03/09/16   0635  02/16/16   1338  12/17/15   1102   HGB  13.5  14.0   --   11.5*   --    --   13.6  15.5   PLT  294  247   --    --   280   --   324  349   INR   --    --    --    --    --    --    --   0.98   NA   --    --    --   140   --    --   139  136   POTASSIUM   --    --    --   4.0   --   4.1  4.2  " 3.8   CR   --    --    --    --   0.79   --   0.76  0.82   A1C   --    --   5.0  6.8*   --    --    --   7.9*        IMPRESSION:                                                    Reason for surgery/procedure: Abdominal plasty, brachioplasty  and bilateral breast lift   Diagnosis/reason for consult: Excess skin, abdominal pannus s/p bariatric surgery    The proposed surgical procedure is considered INTERMEDIATE risk.    REVISED CARDIAC RISK INDEX  The patient has the following serious cardiovascular risks for perioperative complications such as (MI, PE, VFib and 3  AV Block):  No serious cardiac risks  INTERPRETATION: 0 risks: Class I (very low risk - 0.4% complication rate)    The patient has the following additional risks for perioperative complications:  No identified additional risks      ICD-10-CM    1. Preop general physical exam Z01.818 CBC with platelets     Basic metabolic panel  (Ca, Cl, CO2, Creat, Gluc, K, Na, BUN)   2. Bariatric surgery status Z98.84    3. Abdominal pannus E65    4. Excess skin L98.7    5. Type 2 diabetes, diet controlled (H) E11.9 Albumin Random Urine Quantitative with Creat Ratio     A1C FUTURE      TSH with free T4 reflex FUTURE anytime   6. Lipid screening Z13.220 Lipid Profile       RECOMMENDATIONS:                                                        Cardiovascular Risk  None identified      Pulmonary Risk  None identified      --Patient is to take all scheduled medications on the day of surgery EXCEPT for modifications listed below.    APPROVAL GIVEN to proceed with proposed procedure, without further diagnostic evaluation       Signed Electronically by: Shaila Flores MD    Copy of this evaluation report is provided to requesting physician.    [facility] Preop Guidelines

## 2017-09-14 ENCOUNTER — OFFICE VISIT (OUTPATIENT)
Dept: OBGYN | Facility: CLINIC | Age: 42
End: 2017-09-14
Payer: COMMERCIAL

## 2017-09-14 ENCOUNTER — TELEPHONE (OUTPATIENT)
Dept: FAMILY MEDICINE | Facility: CLINIC | Age: 42
End: 2017-09-14

## 2017-09-14 VITALS
SYSTOLIC BLOOD PRESSURE: 129 MMHG | DIASTOLIC BLOOD PRESSURE: 84 MMHG | HEART RATE: 76 BPM | WEIGHT: 152 LBS | BODY MASS INDEX: 27.97 KG/M2 | OXYGEN SATURATION: 98 % | TEMPERATURE: 97.6 F | HEIGHT: 62 IN

## 2017-09-14 DIAGNOSIS — Z30.430 ENCOUNTER FOR IUD INSERTION: ICD-10-CM

## 2017-09-14 DIAGNOSIS — Z01.419 ENCOUNTER FOR GYNECOLOGICAL EXAMINATION WITHOUT ABNORMAL FINDING: Primary | ICD-10-CM

## 2017-09-14 DIAGNOSIS — Z30.46 NEXPLANON REMOVAL: Primary | ICD-10-CM

## 2017-09-14 PROCEDURE — 99396 PREV VISIT EST AGE 40-64: CPT | Performed by: OBSTETRICS & GYNECOLOGY

## 2017-09-14 PROCEDURE — G0145 SCR C/V CYTO,THINLAYER,RESCR: HCPCS | Performed by: OBSTETRICS & GYNECOLOGY

## 2017-09-14 PROCEDURE — 82043 UR ALBUMIN QUANTITATIVE: CPT | Performed by: FAMILY MEDICINE

## 2017-09-14 PROCEDURE — 58300 INSERT INTRAUTERINE DEVICE: CPT | Mod: 59 | Performed by: OBSTETRICS & GYNECOLOGY

## 2017-09-14 PROCEDURE — 87624 HPV HI-RISK TYP POOLED RSLT: CPT | Performed by: OBSTETRICS & GYNECOLOGY

## 2017-09-14 PROCEDURE — 11982 REMOVE DRUG IMPLANT DEVICE: CPT | Mod: 59 | Performed by: OBSTETRICS & GYNECOLOGY

## 2017-09-14 ASSESSMENT — PATIENT HEALTH QUESTIONNAIRE - PHQ9: SUM OF ALL RESPONSES TO PHQ QUESTIONS 1-9: 2

## 2017-09-14 NOTE — MR AVS SNAPSHOT
After Visit Summary   9/14/2017    Monica Mcgee    MRN: [id number]           Patient Information     Date Of Birth          1975        Visit Information        Provider Department      9/14/2017 4:00 PM Agbeh, Cephas Mawuena, MD [facility]        Today's Diagnoses     Nexplanon removal    -  1    Encounter for IUD insertion           Follow-ups after your visit        Your next 10 appointments already scheduled     Sep 15, 2017  9:15 AM CDT   LAB with BE LAB   [place] [facility] ([facility] [place])    [address]  [place] [address] [phone]           Patient must bring picture ID. Patient should be prepared to give a urine specimen  Please do not eat 10-12 hours before your appointment if you are coming in fasting for labs on lipids, cholesterol, or glucose (sugar). Pregnant women should follow their Care Team instructions. Water with medications is okay. Do not drink coffee or other fluids. If you have concerns about taking  your medications, please ask at office or if scheduling via Peeridea, send a message by clicking on Secure Messaging, Message Your Care Team.              Future tests that were ordered for you today     Open Future Orders        Priority Expected Expires Ordered    *MA Screening Digital Bilateral Routine  9/14/2018 9/14/2017            Who to contact     If you have questions or need follow up information about today's clinic visit or your schedule please contact [place] [facility] directly at [phone].  Normal or non-critical lab and imaging results will be communicated to you by MyChart, letter or phone within 4 business days after the clinic has received the results. If you do not hear from us within 7 days, please contact the clinic through Maventhart or phone. If you have a critical or abnormal lab result, we will notify you by phone as soon as possible.  Submit refill requests through Peeridea or call your  pharmacy and they will forward the refill request to us. Please allow 3 business days for your refill to be completed.          Additional Information About Your Visit        MyChart Information     "[employer] gives you secure access to your electronic health record. If you see a primary care provider, you can also send messages to your care team and make appointments. If you have questions, please call your primary care clinic.  If you do not have a primary care provider, please call [phone] and they will assist you.        Care EveryWhere ID     This is your Care EveryWhere ID. This could be used by other organizations to access your [facility] medical records  [id number]        Your Vitals Were     Last Period                   08/23/2017            Blood Pressure from Last 3 Encounters:   09/14/17 129/84   09/11/17 126/81   03/09/17 129/87    Weight from Last 3 Encounters:   09/14/17 152 lb (68.9 kg)   09/11/17 152 lb 9.6 oz (69.2 kg)   03/09/17 153 lb 3.2 oz (69.5 kg)              We Performed the Following     C KYLEENA IUD 19.5 MG     INSERTION INTRAUTERINE DEVICE     REMOVAL NON-BIODEGRADABLE DRUG DELIVERY IMPLANT        Primary Care Provider Office Phone # Fax #    Shaila Flores [phone]       [address] [place] [address]        Equal Access to Services     [facility]: Hadii aad ku hadasho Soomaali, waaxda luqadaha, qaybta kaalmada adeegyada, rolan bartlett haymarelne jones . So [facility] [phone].    ATENCIÓN: Si habla español, tiene a dela cruz disposición servicios gratuitos de asistencia lingüística. Llame al [phone].    We comply with applicable federal civil rights laws and [place] laws. We do not discriminate on the basis of race, color, national origin, age, disability sex, sexual orientation or gender identity.            Thank you!     Thank you for choosing [facility]  for your care. Our goal is always to provide you with excellent care.  "Hearing back from our patients is one way we can continue to improve our services. Please take a few minutes to complete the written survey that you may receive in the mail after your visit with us. Thank you!             Your Updated Medication List - Protect others around you: Learn how to safely use, store and throw away your medicines at www.disposemymeds.org.          This list is accurate as of: 9/14/17  5:07 PM.  Always use your most recent med list.                   Brand Name Dispense Instructions for use Diagnosis    BD INTEGRA SYRINGE 25G X 1\" 3 ML Misc   Generic drug:  syringe/needle (disp)     3 each    USE FOR B-12 INJECTION    Morbid obesity with BMI of 40.0-44.9, adult (H), Bariatric surgery status       CITRACAL +D3 PO      Take 600 mg by mouth 2 times daily        COLACE PO      Take by mouth 2 times daily    Bariatric surgery status, Obesity (BMI 30.0-34.9)       cyanocobalamin 1000 MCG/ML injection    VITAMIN B12    3 mL    Inject 1 mL (1,000 mcg) Subcutaneous every 30 days    Morbid obesity with BMI of 40.0-44.9, adult (H), Bariatric surgery status       etonogestrel 68 MG Impl    IMPLANON/NEXPLANON     1 each by Subdermal route once        FISH OIL PO           ketoconazole 2 % cream    NIZORAL    15 g    Apply topically 2 times daily    Intertrigo       multivitamin  peds with iron 60 MG chewable tablet      Take 2 chew tab by mouth daily (with lunch)        nystatin 258409 UNIT/GM Powd    MYCOSTATIN    60 g    Apply topically 3 times daily as needed    Intertrigo       VITAMIN D3 PO      Take 1,000 Units by mouth 2 times daily          "

## 2017-09-14 NOTE — NURSING NOTE
"Chief Complaint   Patient presents with     Gyn Exam     nexplanon removal       Initial There were no vitals taken for this visit. Estimated body mass index is 27.8 kg/(m^2) as calculated from the following:    Height as of an earlier encounter on 9/14/17: 5' 2\" (1.575 m).    Weight as of an earlier encounter on 9/14/17: 152 lb (68.9 kg).  Medication Reconciliation: complete     Kassidy Jara LPN    "

## 2017-09-14 NOTE — LETTER
September 22, 2017    Monica Mcgee  [address] [place] [address]    Dear Monica,  We are happy to inform you that your PAP smear result from 9/14/17 is normal.  We are now able to do a follow up test on PAP smears. The DNA test is for HPV (Human Papilloma Virus). Cervical cancer is closely linked with certain types of HPV. Your result showed no evidence of high risk HPV.  Therefore we recommend you return in 3 years for your next pap smear.  You will still need to return to the clinic every year for an annual exam and other preventive tests.  Please contact the clinic at [phone] with any questions.  Sincerely,    Cephas Mawuena Agbeh, MD/sajan

## 2017-09-14 NOTE — PROGRESS NOTES
Monica is a 42 year old  here for annual exam.   She had gastric bypass and lost over 90 lbs. She is due to have cosmetic surgery to correct the excess loose skin and breast surgery.Needs Nexplanon removed for the surgery. She wants KYLEENA IUD placed.    ROS: Ten point review of systems was reviewed and negative except the above.    Health Maintenance   Topic Date Due     EYE EXAM Q1 YEAR  1976     PNEUMOVAX 1X HI RISK PATIENT < 65 (NO IB MSG)  1978     LIPID MONITORING Q1 YEAR  2016     A1C Q6 MO  2016     MICROALBUMIN Q1 YEAR  2017     CREATININE Q1 YEAR  2017     PAP Q3 YR  2017     FOOT EXAM Q1 YEAR  2017     TSH W/ FREE T4 REFLEX Q2 YEAR  2017     INFLUENZA VACCINE (SYSTEM ASSIGNED)  2017     TETANUS IMMUNIZATION (SYSTEM ASSIGNED)  2026      Last pap:   Last Mammogram:   Last Dexa: none  Last Colonoscopy: none  Lab Results   Component Value Date    CHOL 186 2015     Lab Results   Component Value Date    HDL 40 2015     Lab Results   Component Value Date     2015     Lab Results   Component Value Date    TRIG 145 2015     No results found for: CHOLHDLRATIO      OBHX:    Obstetric History       T0      L2     SAB2   TAB0   Ectopic0   Multiple0   Live Births2       # Outcome Date GA Lbr Branden/2nd Weight Sex Delivery Anes PTL Lv   4       -SEC   KELLEY   3       -SEC   KELLEY   2 TAB            1 TAB                   Past Surgical History:   Procedure Laterality Date      SECTION  , 2001    x 2     LAPAROSCOPIC BYPASS GASTRIC N/A 3/9/2016    Procedure: LAPAROSCOPIC BYPASS GASTRIC;  Surgeon: Antoine Alvarez MD;  Location:  OR     LAPAROSCOPIC HERNIORRHAPHY HIATAL N/A 3/9/2016    Procedure: LAPAROSCOPIC HERNIORRHAPHY HIATAL;  Surgeon: Antoine Alvarez MD;  Location:  OR       [facility]: Her past medical, surgical, and obstetric histories were  "reviewed and are documented in their appropriate chart areas.    ALL/Meds: Her medication and allergy histories were reviewed and are documented in their appropriate chart areas.    SH/FMH: Her social and family history was reviewed and documented in its appropriate chart area.    PE: /84 (BP Location: Left arm, Patient Position: Chair, Cuff Size: Adult Regular)  Pulse 76  Temp 97.6  F (36.4  C) (Tympanic)  Ht 5' 2\" (1.575 m)  Wt 152 lb (68.9 kg)  LMP 08/23/2017  SpO2 98%  BMI 27.8 kg/m2  Body mass index is 27.8 kg/(m^2).    General Appearance:  healthy, alert, active, no distress  Cardiovascular:  Regular rate and Rhythm  Neck: Supple, no adenopathy and thyroid normal  Lungs:  Clear, without wheeze, rale or rhonchi  Breast: normal breast exam  Abdomen: Benign, Soft, flat, non-tender, No masses, organomegaly, No inguinal nodes and Bowel sounds normoactiveSoft, nontender.   Pelvic:       - Ext: Vulva and perineum are normal without lesion, mass or discharge        - Urethra: normal without discharge or scarring  hypermobility       - Urethral Meatus: normal appearance,        - Bladder: no tenderness, no masses       - Vagina: Normal mucosa, no discharge     rugated       - Cervix: nulliparous       - Uterus:Normal shape, position and consistencyfirm, nontender, nongravid uterus without CMT       - Adnexa: Normal without masses or tenderness       - Rectal: deferred    A/P:  Well Woman,     ICD-10-CM    1. Encounter for gynecological examination without abnormal finding Z01.419 Pap imaged thin layer screen with HPV - recommended age 30 - 65 years (select HPV order below)     HPV High Risk Types DNA Cervical     *MA Screening Digital Bilateral        - Encouraged self-breast exam   - Encouraged low fat diet, regular exercise, and adequate calcium intake.    CEPHAS AGBEH, MD.       "

## 2017-09-14 NOTE — PROGRESS NOTES
Monica Mcgee is a 42 year old female  Patient's last menstrual period was 2017. .After 2 years of Implanon as a contraceptive, she was here today for its removal    LMP 2017    NONE    Monica was couseled about removal. She voiced her understanding and informed consent was obtained.    Patient was placed in a supine position. Left arm was flexed at the elbow and externally rotated. The implant was located by palpation and marked at the end closest to the elbow with a sterile marker. The implant was palpated by both myself and the patient.      The area was cleaned and antiseptic applied. I injected sufficient anesthetic, 1cc of 1% Lidocaine just underneath the end of the implant closest to the elbow.  I pressed down on the end of the implant closest to the axilla and made a 3 mm incision in the longitudinal direction  Of the arm at the tip of the implant closest to the elbow. I gently pushed the implant toward the incision until the tip was visible and grasped the implant with steril mosquito foreceps and gently removed it.   The incision was closed with a butterfly closure and an adhesive bandage appied. A sterile gauze with pressure bandage was also applied.    Asceptic conditions were maintained throughout the procedure. The patient tolerated the procedure well.  No apparent complications.    CEPHAS AGBEH, MD.      Monica Mcgee is a 42 year old  who presents today requesting placement of an KYLEENA iud.    The patient meets and is agreeable to the following conditions:  She is not interested in conception in the near future.   She currently is in a stable, monogamous relationship.   There is no previous history of pelvic inflammatory disease.   There is no previous history of ectopic pregnancy.   She is willing to check monthly for the IUD string.   There is no history of unresolved abnormal uterine bleeding.   There is no history of an unresolved abnormal PAP smear.   She has no  history of diabetes, AIDS, leukemia, IV drug use or chronic steroid use.   She is willing to return annually for PAP smears.   She has had a PAP smear within the past 6 months.     We discussed risks, benefits, and alternatives including but not limited to:   Possibility of pregnancy and ectopic pregnancy.  Possibility of pelvic inflammatory disease, particularly with new partners.  Risk of uterine perforation or IUD expulsion.  Possibility of difficult removal.  Spotting or heavy bleeding.  Cramping, pain or infection during or after insertion.    The patient was given patient information on the IUD and the patient education brochure from the .  The patient has given consent to proceed with placement of the IUD.  She wishes to proceed.  All questions answered.      PROCEDURE:  Type of IUD: KYLEENA    She is placed in a dorsal lithotomy potion and a pelvic exam is performed to determine the position of the uterus.  The cervix is identified and cleaned with betadine.  A single tooth tenaculum is applied to the anterior lip of the cervix for stabilization. Cervical block with 10 cc lidocaine 1%.  The uterus was dilated and sounded to 7.0 cm. (Target sound depth is 6.5 cm to 8.5 cm.)  The IUD insertion tube is prepared to manufacturers recommendations and inserted into the uterus under sterile conditions in the usual fashion.  The IUD string is then cut to 4.0 cm.    The patient tolerated this procedure without immediate complication.  The patient is to return or call immediately for any unexplained fever, abdominal or pelvic pain, excessive bleeding, possibility of pregnancy, foul-smelling discharge, sense that the IUD has been expelled.  All questions were answered.    Return to clinic in 1 month for IUD check        ICD-10-CM    1. Nexplanon removal Z30.46 REMOVAL NON-BIODEGRADABLE DRUG DELIVERY IMPLANT   2. Encounter for IUD insertion Z30.430 INSERTION INTRAUTERINE DEVICE     C KYLEENA IUD 19.5 MG        CEPHAS AGBEH, MD.

## 2017-09-14 NOTE — NURSING NOTE
"Chief Complaint   Patient presents with     Physical       Initial /84 (BP Location: Left arm, Patient Position: Chair, Cuff Size: Adult Regular)  Pulse 76  Temp 97.6  F (36.4  C) (Tympanic)  Ht 5' 2\" (1.575 m)  Wt 152 lb (68.9 kg)  SpO2 98%  BMI 27.8 kg/m2 Estimated body mass index is 27.8 kg/(m^2) as calculated from the following:    Height as of this encounter: 5' 2\" (1.575 m).    Weight as of this encounter: 152 lb (68.9 kg).  Medication Reconciliation: complete     Kassidy Jara LPN    "

## 2017-09-14 NOTE — MR AVS SNAPSHOT
After Visit Summary   9/14/2017    Monica Mcgee    MRN: [id number]           Patient Information     Date Of Birth          1975        Visit Information        Provider Department      9/14/2017 4:30 PM Agbeh, Cephas Mawuena, MD [facility]        Today's Diagnoses     Encounter for gynecological examination without abnormal finding    -  1       Follow-ups after your visit        Your next 10 appointments already scheduled     Sep 15, 2017  9:15 AM CDT   LAB with BE LAB   [place] [facility] ([facility] [place])    [address] [place] [address] [phone]           Patient must bring picture ID. Patient should be prepared to give a urine specimen  Please do not eat 10-12 hours before your appointment if you are coming in fasting for labs on lipids, cholesterol, or glucose (sugar). Pregnant women should follow their Care Team instructions. Water with medications is okay. Do not drink coffee or other fluids. If you have concerns about taking  your medications, please ask at office or if scheduling via [employer], send a message by clicking on Secure Messaging, Message Your Care Team.              Future tests that were ordered for you today     Open Future Orders        Priority Expected Expires Ordered    *MA Screening Digital Bilateral Routine  9/14/2018 9/14/2017            Who to contact     If you have questions or need follow up information about today's clinic visit or your schedule please contact [place] [facility] directly at [phone].  Normal or non-critical lab and imaging results will be communicated to you by [employer]eralytics[employer], letter or phone within 4 business days after the clinic has received the results. If you do not hear from us within 7 days, please contact the clinic through Teral[employer] or phone. If you have a critical or abnormal lab result, we will notify you by phone as soon as possible.  Submit refill requests through Ideatory or  "call your pharmacy and they will forward the refill request to us. Please allow 3 business days for your refill to be completed.          Additional Information About Your Visit        MyChart Information     [employer] gives you secure access to your electronic health record. If you see a primary care provider, you can also send messages to your care team and make appointments. If you have questions, please call your primary care clinic.  If you do not have a primary care provider, please call [phone] and they will assist you.        Care EveryWhere ID     This is your Care EveryWhere ID. This could be used by other organizations to access your [facility] medical records  [id number]        Your Vitals Were     Pulse Temperature Height Last Period Pulse Oximetry BMI (Body Mass Index)    76 97.6  F (36.4  C) (Tympanic) 5' 2\" (1.575 m) 08/23/2017 98% 27.8 kg/m2       Blood Pressure from Last 3 Encounters:   09/14/17 129/84   09/11/17 126/81   03/09/17 129/87    Weight from Last 3 Encounters:   09/14/17 152 lb (68.9 kg)   09/11/17 152 lb 9.6 oz (69.2 kg)   03/09/17 153 lb 3.2 oz (69.5 kg)              We Performed the Following     HPV High Risk Types DNA Cervical     Pap imaged thin layer screen with HPV - recommended age 30 - 65 years (select HPV order below)        Primary Care Provider Office Phone # Fax #    Shaila Flores [phone]       [address]  [place] [address]        Equal Access to Services     [facility] : Hadii ricardo ku hadasho Soomaali, waaxda luqadaha, qaybta kaalmada adeegnasima, rolan jones . So [facility] [phone].    ATENCIÓN: Si habla español, tiene a dela cruz disposición servicios gratuitos de asistencia lingüística. Llame al [phone].    We comply with applicable federal civil rights laws and [place] laws. We do not discriminate on the basis of race, color, national origin, age, disability sex, sexual orientation or gender identity.       " "     Thank you!     Thank you for choosing [facility]  for your care. Our goal is always to provide you with excellent care. Hearing back from our patients is one way we can continue to improve our services. Please take a few minutes to complete the written survey that you may receive in the mail after your visit with us. Thank you!             Your Updated Medication List - Protect others around you: Learn how to safely use, store and throw away your medicines at www.disposemymeds.org.          This list is accurate as of: 9/14/17  4:57 PM.  Always use your most recent med list.                   Brand Name Dispense Instructions for use Diagnosis    BD INTEGRA SYRINGE 25G X 1\" 3 ML Misc   Generic drug:  syringe/needle (disp)     3 each    USE FOR B-12 INJECTION    Morbid obesity with BMI of 40.0-44.9, adult (H), Bariatric surgery status       CITRACAL +D3 PO      Take 600 mg by mouth 2 times daily        COLACE PO      Take by mouth 2 times daily    Bariatric surgery status, Obesity (BMI 30.0-34.9)       cyanocobalamin 1000 MCG/ML injection    VITAMIN B12    3 mL    Inject 1 mL (1,000 mcg) Subcutaneous every 30 days    Morbid obesity with BMI of 40.0-44.9, adult (H), Bariatric surgery status       etonogestrel 68 MG Impl    IMPLANON/NEXPLANON     1 each by Subdermal route once        FISH OIL PO           ketoconazole 2 % cream    NIZORAL    15 g    Apply topically 2 times daily    Intertrigo       multivitamin  peds with iron 60 MG chewable tablet      Take 2 chew tab by mouth daily (with lunch)        nystatin [id number] UNIT/GM Powd    MYCOSTATIN    60 g    Apply topically 3 times daily as needed    Intertrigo       VITAMIN D3 PO      Take 1,000 Units by mouth 2 times daily          "

## 2017-09-15 DIAGNOSIS — E11.9 TYPE 2 DIABETES, DIET CONTROLLED (H): ICD-10-CM

## 2017-09-15 DIAGNOSIS — Z13.220 LIPID SCREENING: ICD-10-CM

## 2017-09-15 DIAGNOSIS — Z01.818 PREOP GENERAL PHYSICAL EXAM: ICD-10-CM

## 2017-09-15 LAB
ANION GAP SERPL CALCULATED.3IONS-SCNC: 8 MMOL/L (ref 3–14)
BUN SERPL-MCNC: 17 MG/DL (ref 7–30)
CALCIUM SERPL-MCNC: 9 MG/DL (ref 8.5–10.1)
CHLORIDE SERPL-SCNC: 105 MMOL/L (ref 94–109)
CHOLEST SERPL-MCNC: 143 MG/DL
CO2 SERPL-SCNC: 27 MMOL/L (ref 20–32)
CREAT SERPL-MCNC: 0.72 MG/DL (ref 0.52–1.04)
CREAT UR-MCNC: 159 MG/DL
ERYTHROCYTE [DISTWIDTH] IN BLOOD BY AUTOMATED COUNT: 11.3 % (ref 10–15)
GFR SERPL CREATININE-BSD FRML MDRD: 89 ML/MIN/1.7M2
GLUCOSE SERPL-MCNC: 93 MG/DL (ref 70–99)
HBA1C MFR BLD: 4.9 % (ref 4.3–6)
HCT VFR BLD AUTO: 41.3 % (ref 35–47)
HDLC SERPL-MCNC: 65 MG/DL
HGB BLD-MCNC: 14.4 G/DL (ref 11.7–15.7)
LDLC SERPL CALC-MCNC: 63 MG/DL
MCH RBC QN AUTO: 33 PG (ref 26.5–33)
MCHC RBC AUTO-ENTMCNC: 34.9 G/DL (ref 31.5–36.5)
MCV RBC AUTO: 95 FL (ref 78–100)
MICROALBUMIN UR-MCNC: 7 MG/L
MICROALBUMIN/CREAT UR: 4.6 MG/G CR (ref 0–25)
NONHDLC SERPL-MCNC: 78 MG/DL
PLATELET # BLD AUTO: 248 10E9/L (ref 150–450)
POTASSIUM SERPL-SCNC: 4.3 MMOL/L (ref 3.4–5.3)
RBC # BLD AUTO: 4.36 10E12/L (ref 3.8–5.2)
SODIUM SERPL-SCNC: 140 MMOL/L (ref 133–144)
TRIGL SERPL-MCNC: 76 MG/DL
TSH SERPL DL<=0.005 MIU/L-ACNC: 2.2 MU/L (ref 0.4–4)
WBC # BLD AUTO: 7 10E9/L (ref 4–11)

## 2017-09-15 PROCEDURE — 80048 BASIC METABOLIC PNL TOTAL CA: CPT | Performed by: FAMILY MEDICINE

## 2017-09-15 PROCEDURE — 84443 ASSAY THYROID STIM HORMONE: CPT | Performed by: FAMILY MEDICINE

## 2017-09-15 PROCEDURE — 85027 COMPLETE CBC AUTOMATED: CPT | Performed by: FAMILY MEDICINE

## 2017-09-15 PROCEDURE — 83036 HEMOGLOBIN GLYCOSYLATED A1C: CPT | Performed by: FAMILY MEDICINE

## 2017-09-15 PROCEDURE — 36415 COLL VENOUS BLD VENIPUNCTURE: CPT | Performed by: FAMILY MEDICINE

## 2017-09-15 PROCEDURE — 80061 LIPID PANEL: CPT | Performed by: FAMILY MEDICINE

## 2017-09-15 NOTE — TELEPHONE ENCOUNTER
Pre op form completed in [facility]. Pre op faxed to [phone]. [facility]

## 2017-09-19 LAB
COPATH REPORT: NORMAL
PAP: NORMAL

## 2017-09-21 LAB
FINAL DIAGNOSIS: NORMAL
HPV HR 12 DNA CVX QL NAA+PROBE: NEGATIVE
HPV16 DNA SPEC QL NAA+PROBE: NEGATIVE
HPV18 DNA SPEC QL NAA+PROBE: NEGATIVE
SPECIMEN DESCRIPTION: NORMAL

## 2017-09-26 NOTE — TELEPHONE ENCOUNTER
Panel Management Review      Patient has the following on her problem list:     Diabetes    ASA: Passed    Last A1C  Lab Results   Component Value Date    A1C 4.9 09/15/2017    A1C 5.0 06/09/2016    A1C 6.8 03/10/2016    A1C 7.9 12/17/2015    A1C 6.1 05/27/2015     A1C tested: Passed    Last LDL:    Lab Results   Component Value Date    CHOL 143 09/15/2017     Lab Results   Component Value Date    HDL 65 09/15/2017     Lab Results   Component Value Date    LDL 63 09/15/2017     Lab Results   Component Value Date    TRIG 76 09/15/2017     No results found for: CHOLHDLRATIO  Lab Results   Component Value Date    NHDL 78 09/15/2017       Is the patient on a Statin? YES             Is the patient on Aspirin? YES        Last three blood pressure readings:  BP Readings from Last 3 Encounters:   09/14/17 129/84   09/11/17 126/81   03/09/17 129/87       Date of last diabetes office visit: 9/15/17- A1C WAS COMPLETED AND PASSING     Tobacco History:     History   Smoking Status     Never Smoker   Smokeless Tobacco     Not on file             Composite cancer screening  Chart review shows that this patient is due/due soon for the following None  Summary:    Patient is due/failing the following:   NONE    Action needed:   NONE    Type of outreach:NONENONE    Questions for provider review:    None                                                                                                                                    Katerin Doss MA       Chart routed to Care Team .

## 2018-02-27 ENCOUNTER — TELEPHONE (OUTPATIENT)
Dept: SURGERY | Facility: CLINIC | Age: 43
End: 2018-02-27

## 2018-02-27 DIAGNOSIS — K91.2 POSTSURGICAL MALABSORPTION: ICD-10-CM

## 2018-02-27 DIAGNOSIS — Z98.84 BARIATRIC SURGERY STATUS: ICD-10-CM

## 2018-02-27 NOTE — TELEPHONE ENCOUNTER
She is coming in for annual weight loss appointment 3/12/18  Wondering if there are any labs that need to be done prior to  That appointment, if so she would like orders put in the system  So she can go do that today as she has a day off  Please call her to let her know    Phone:  [phone]  Ok to leave message

## 2018-03-06 NOTE — TELEPHONE ENCOUNTER
Patient calling back, wondering if labs are needed before appointment on 03/12/18  Left message 2/27/18, but did not get a call back   Ph:  [phone]  Ok to leave a message

## 2018-03-06 NOTE — TELEPHONE ENCOUNTER
Orders placed per standard protocol.  Patient has been seen in clinic for every follow up since surgery in 2016.  Patient notified.  Cele Rubio MS, RD, RN

## 2018-03-12 ENCOUNTER — OFFICE VISIT (OUTPATIENT)
Dept: SURGERY | Facility: CLINIC | Age: 43
End: 2018-03-12
Payer: COMMERCIAL

## 2018-03-12 VITALS
BODY MASS INDEX: 26.38 KG/M2 | HEART RATE: 67 BPM | WEIGHT: 144.25 LBS | DIASTOLIC BLOOD PRESSURE: 80 MMHG | SYSTOLIC BLOOD PRESSURE: 122 MMHG

## 2018-03-12 VITALS — BODY MASS INDEX: 25.52 KG/M2 | HEIGHT: 63 IN | WEIGHT: 144 LBS

## 2018-03-12 DIAGNOSIS — Z98.84 BARIATRIC SURGERY STATUS: ICD-10-CM

## 2018-03-12 DIAGNOSIS — K91.2 MALNUTRITION FOLLOWING GASTROINTESTINAL SURGERY: ICD-10-CM

## 2018-03-12 DIAGNOSIS — E66.3 OVERWEIGHT (BMI 25.0-29.9): ICD-10-CM

## 2018-03-12 DIAGNOSIS — R79.89 ELEVATED FERRITIN: Primary | ICD-10-CM

## 2018-03-12 DIAGNOSIS — K59.09 OTHER CONSTIPATION: ICD-10-CM

## 2018-03-12 PROCEDURE — 97803 MED NUTRITION INDIV SUBSEQ: CPT | Performed by: DIETITIAN, REGISTERED

## 2018-03-12 PROCEDURE — 99213 OFFICE O/P EST LOW 20 MIN: CPT | Performed by: PHYSICIAN ASSISTANT

## 2018-03-12 RX ORDER — SENNOSIDES 8.6 MG
1 TABLET ORAL AT BEDTIME
COMMUNITY
End: 2020-01-08

## 2018-03-12 RX ORDER — CYANOCOBALAMIN 1000 UG/ML
1 INJECTION, SOLUTION INTRAMUSCULAR; SUBCUTANEOUS
Qty: 3 ML | Refills: 3 | Status: SHIPPED | OUTPATIENT
Start: 2018-03-12 | End: 2019-04-13

## 2018-03-12 NOTE — PROGRESS NOTES
"NUTRITION POST OP APPOINTMENT  DATE OF VISIT: March 12, 2018    Monica Mcgee  1975  female  [id number]  42 year old     ASSESSMENT:    REASON FOR VISIT:  Monica is a 42 year old year old female presents today for 2 year PO nutrition follow-up appointment. Patient is accompanied by self.    DIAGNOSIS:  Status post gastric bypass surgery.  Obesity Overweight BMI 25-29.9     ANTHROPOMETRICS:  Initial Weight:    Height: 158.8 cm (5' 2.5\")  Current Weight: 65.3 kg (144 lb)   BMI: 25.97 kg/(m^2).    VITAMINS AND MINERALS:  2 Multivitamin with Minerals  2-300 mg Calcium With Vitamin D BID  2-1000 International units Vitamin D  1000 mcg Vitamin B-12 injection  No Iron needed      NUTRITION HISTORY:  Breakfast: 1/2 cup granola + 1% milk (eat when soggy)  Lunch: [place] breakfast sandwich (leone, egg, cheese)  Supper: 1/2 cup spaghetti with marinara sauce, 2 meatballs or soft shell beef taco with black beans   Snacks: leftovers from dinner after dinner (if dinner is early) 3 X per week  Fluids consumed: Water and 3 Turtle mochas per week  Consuming liquid calories: Yes  Protein intake: 40-45 grams/day  Tolerate regular texture food: Yes  Any foods not tolerated details: Yes  If any food not tolerated: milk shake  Portion size: 1 cup  Take 20-30 minutes to consume each meal: Yes   Eat protein foods first: No  Fluids and meals separate by at least 30 minutes: Yes  Chew foods thoroughly: Yes  Tolerating diet: Yes  Drinking high protein supplements: No  Consuming snacks per day: 1 X 4 times per week  Additional Information: patient shared that she had plastic surgery in October and lost 13 pounds, but had restrictions on physical activity for 3 months; pt is concerned about some of her food choices/ habits (specialty coffee 3 X per week, higher calorie food choices); discussed alternatives to granola for breakfast      PHYSICAL ACTIVITY:  Type: none (just rejoined a gym, but has not started " going)      DIAGNOSIS:  Previous Nutrition Diagnosis: Altered gastrointestinal function related to alteration in gastrointestinal structure as evidenced by history of gastric bypass surgery.- no change    Previous goals:  Eliminate snacking -not met  Do not exceed 1 cup per meal and limit high fat items like nuts, sauces and cheese-partially met   Take 20 minutes to finish each meal-met  Separate fluids and meals by 30 minutes-met  Exercise 4x/week-not met (see physical activity, above)    Current Nutrition Diagnosis: Altered gastrointestinal function related to alteration in gastrointestinal structure as evidenced by history of gastric bypass surgery.    INTERVENTION:   Nutrition Prescription: Eat 3 meals a day at regular intervals. Consume 60-90 grams of protein daily. Follow post-surgical vitamin and mineral protocol.  Assessed learning needs and learning preferences.    GOALS:  Relating To Eating:  Consume 60-90 g protein per day  Replace sancks with 1 half-1 protein drink per day (does not cre for milk)     Relating to beverages:  Replace specialty coffee drink with a lower calorie decaf. coffee or herbal or decaf tea    Relating to activity:  Restart exercise 3 X per week (get strength training in 2 X per week)    Follow-Up:   Implementation: Discussed progress toward previous goals; reinforced importance of following bariatric lifestyle changes.    NUTRITION MONITORING AND EVALUATION:  Anticipated compliance: good  Verbalized fair-good understanding.    Follow up: Patient to follow up in 12 months.    TIME SPENT WITH PATIENT:  25 minutes    Low Yang RD, LD  [facility]  [phone]

## 2018-03-12 NOTE — MR AVS SNAPSHOT
MRN:[id number]                      After Visit Summary   3/12/2018    Monica Mcgee    MRN: [id number]           Visit Information        Provider Department      3/12/2018 3:00 PM 1, Sh William Ugalde RD [facility]      MyChart Information     [employer] gives you secure access to your electronic health record. If you see a primary care provider, you can also send messages to your care team and make appointments. If you have questions, please call your primary care clinic.  If you do not have a primary care provider, please call [phone] and they will assist you.        Care EveryWhere ID     This is your Care EveryWhere ID. This could be used by other organizations to access your [facility] medical records  [id number]        Equal Access to Services     TAL MOYER : Grabiel Villegas, wabobo rucker, geraldo kaalkody dunaway, rolan talamantes. So [facility] [phone].    ATENCIÓN: Si habla español, tiene a dela cruz disposición servicios gratuitos de asistencia lingüística. Llame al [phone].    We comply with applicable federal civil rights laws and [place] laws. We do not discriminate on the basis of race, color, national origin, age, disability, sex, sexual orientation, or gender identity.

## 2018-03-12 NOTE — PROGRESS NOTES
BARIATRIC FOLLOW UP VISIT     March 12, 2018     HISTORY OF PRESENT ILLNESS: Pt returns today for her follow-up appointment status post laparoscopic gastric bypass. She is doing well. She recently underwent significant body contouring surgery. States in total with excess skin and a little bit of lipo they removed a total of 13 lbs.    She was on total exercise restriction for 3 months and no core exercise for an additional 3 months. Previous to skin removal she was doing cross fit 3x a week.  Is ready to restart.  Has 1 more month of core restriction then plans on starting at [employer] to incorporate weights.    Initial Weight: 237 lb (107.5 kg)   Current Weight: 144 lb 4 oz (65.4 kg)  Cumulative weight loss (lbs): 92.75  Last Visits Weight: 153 lb 3.2 oz (69.5 kg)     Patient is taking the following bariatric postoperative vitamins:  2 Complete multivitamins with minerals (at different times than calcium)   2000 International Units of Vitamin D daily  6375-4019 mg of Calcium daily in divided doses  1000 mcg of Vitamin B12 in       OBESITY RELATED CONDITIONS:  Diabetes type II- 9/15/17 HgA1C 4.9 on no medications.     SOCIAL HISTORY:  Pt denies smoking.  Pt has 1 alcohol beverage when out 1-2 times a month and doesn't finish the 1 drink use.  Avoids NSAIDS.      REVIEW OF SYSTEMS:     GI:  Nausea-never  Vomiting-never  Diarrhea-never  Constipation-seldom on probiotic, fish oil, stool softener qam, and Senna qpm.  Has BM every morning.  Without Senna has BM every three days but states she is miserable. Tried Miralax but is unable to tolerate it. Does not like taking prunes because does not want to use the carb's and sweetness for this.  Dysphagia-never  Abdominal Pain--never  Heartburn--never     SKIN:  Intertriginous irritation-none. Pt has panniculectomy, brachipexy, and breast lift in October with Dr. Parikh.         PSYCH:  Depression-none  Anxiety-none    :   Pt is on Mirena IUD now so she is not  getting periods anymore.  This is new this year.        LABS/IMAGING/MEDICAL RECORDS REVIEW: HgA1C 9/15/17, 2/27/17 Ferritin level and Iron studies, 9/12/16 Ferritin level and Iron studies,    PHYSICAL EXAMINATION:   /80  Pulse 67  Wt 144 lb 4 oz (65.4 kg)  BMI 25.96 kg/m2    GENERAL: Alert and oriented x3. NAD  HEART: No murmurs, rubs or gallops, Regular rate and rhythm  LUNGS: Breathing unlabored, Lung sounds clear to auscultation bilaterally  ABDOMEN: soft; nontender; nondistended, incision well healed. No hernia  EXTREMITIES: No LE edema bilaterally, Gait normal  SKIN: No intertriginous irritation or rash      ASSESSMENT AND PLAN:      2 years  status laparoscopic gastric bypass  Overweight Body mass index is 25.96 kg/(m^2)..  Post surgical malabsorption:   Ordered vitamin B12, vitamin D, PTH,  Elevated Ferritin Level   Ordered CBC, ferritin, TIBC, CRP, and iron labs.   If Ferritin still high, may consider discussing with hematologist.     Ordered Bone Density Scan.   Follow food plan per dietitian recommendations.   Continue taking recommended post-op vitamins.  Constipation   Discussed trying to avoid daily laxatives as older thinking I that it causes rebound constipation.  Discussed that recent Medtalk from [facility] showed studies now are saying daily laxatives are safe.  Pt is going to try going back to Colace BID.That worked in the past.  She will try and look into the studies and talk with [facility] regarding he medtalk publication.  Return to clinic in 1 year.      I spent a total of 20 minutes face to face with Monica during today's office visit. Over 50% of this time was spent counseling the patient and/or coordinating care.

## 2018-03-12 NOTE — MR AVS SNAPSHOT
After Visit Summary   3/12/2018    Monica Mcgee    MRN: [id number]           Patient Information     Date Of Birth          1975        Visit Information        Provider Department      3/12/2018 3:30 PM Tania Salazar PA-C [facility]      Today's Diagnoses     Elevated ferritin    -  1    Bariatric surgery status        Overweight (BMI 25.0-29.9)        Malnutrition following gastrointestinal surgery        Other constipation          Care Instructions    uJdy Mejia          Follow-ups after your visit        Follow-up notes from your care team     Return in 1 year (on 3/12/2019).      Future tests that were ordered for you today     Open Future Orders        Priority Expected Expires Ordered    CRP inflammation Routine  3/12/2019 3/12/2018    DX Hip/Pelvis/Spine Routine  3/12/2019 3/12/2018            Who to contact     If you have questions or need follow up information about today's clinic visit or your schedule please contact [facility] directly at [phone].  Normal or non-critical lab and imaging results will be communicated to you by [employer]art, letter or phone within 4 business days after the clinic has received the results. If you do not hear from us within 7 days, please contact the clinic through [employer] or phone. If you have a critical or abnormal lab result, we will notify you by phone as soon as possible.  Submit refill requests through [employer] or call your pharmacy and they will forward the refill request to us. Please allow 3 business days for your refill to be completed.          Additional Information About Your Visit        [employer]art Information     [employer] gives you secure access to your electronic health record. If you see a primary care provider, you can also send messages to your care team and make appointments. If you have questions,  "please call your primary care clinic.  If you do not have a primary care provider, please call [phone] and they will assist you.        Care EveryWhere ID     This is your Care EveryWhere ID. This could be used by other organizations to access your [facility] medical records  [id number]        Your Vitals Were     Pulse BMI (Body Mass Index)                67 25.96 kg/m2           Blood Pressure from Last 3 Encounters:   03/12/18 122/80   09/14/17 129/84   09/11/17 126/81    Weight from Last 3 Encounters:   03/12/18 144 lb 4 oz (65.4 kg)   03/12/18 144 lb (65.3 kg)   09/14/17 152 lb (68.9 kg)                 Today's Medication Changes          These changes are accurate as of 3/12/18  9:26 PM.  If you have any questions, ask your nurse or doctor.               These medicines have changed or have updated prescriptions.        Dose/Directions    syringe/needle (disp) 25G X 1\" 3 ML Misc   Commonly known as:  BD INTEGRA SYRINGE   This may have changed:  See the new instructions.   Used for:  Bariatric surgery status   Changed by:  Tania Salazar PA-C        USE FOR B-12 INJECTION   Quantity:  3 each   Refills:  3         Stop taking these medicines if you haven't already. Please contact your care team if you have questions.     ketoconazole 2 % cream   Commonly known as:  NIZORAL   Stopped by:  Tania Salazar PA-C           nystatin 638385 UNIT/GM Powd   Commonly known as:  MYCOSTATIN   Stopped by:  Tania Salazar PA-C                Where to get your medicines      These medications were sent to [id number] - [place], [place] - [address]  [address] [place] [address]    Hours:  test fax successful 4/5/04  Phone:  [phone]     cyanocobalamin 1000 MCG/ML injection    syringe/needle (disp) 25G X 1\" 3 ML Misc                Primary Care Provider Office Phone # Fax #    Shaila Flores [phone] [address] " "[place] [address]        Equal Access to Services     [facility] COMFORT : Hadii ricardo ward javyalex Nelly, wajackieda luqadaha, qaybta krzysztofmanrolan ojeda. So [facility] [phone].    ATENCIÓN: Si habla español, tiene a dela cruz disposición servicios gratuitos de asistencia lingüística. Tamiko al [phone].    We comply with applicable federal civil rights laws and [place] laws. We do not discriminate on the basis of race, color, national origin, age, disability, sex, sexual orientation, or gender identity.            Thank you!     Thank you for choosing [facility]  for your care. Our goal is always to provide you with excellent care. Hearing back from our patients is one way we can continue to improve our services. Please take a few minutes to complete the written survey that you may receive in the mail after your visit with us. Thank you!             Your Updated Medication List - Protect others around you: Learn how to safely use, store and throw away your medicines at www.disposemymeds.org.          This list is accurate as of 3/12/18  9:26 PM.  Always use your most recent med list.                   Brand Name Dispense Instructions for use Diagnosis    CITRACAL +D3 PO      Take 600 mg by mouth 2 times daily        COLACE PO      Take by mouth 2 times daily    Bariatric surgery status, Obesity (BMI 30.0-34.9)       cyanocobalamin 1000 MCG/ML injection    VITAMIN B12    3 mL    Inject 1 mL (1,000 mcg) Subcutaneous every 30 days    Bariatric surgery status       FISH OIL PO           levonorgestrel 20 MCG/24HR IUD    MIRENA    1 each    1 each (20 mcg) by Intrauterine route once for 1 dose        multivitamin  peds with iron 60 MG chewable tablet      Take 2 chew tab by mouth daily (with lunch)        PROBIOTIC ACIDOPHILUS PO           sennosides 8.6 MG tablet    SENOKOT     Take 1 tablet by mouth At Bedtime        syringe/needle (disp) 25G X 1\" 3 ML " Misc    BD INTEGRA SYRINGE    3 each    USE FOR B-12 INJECTION    Bariatric surgery status       VITAMIN D3 PO      Take 1,000 Units by mouth 2 times daily

## 2018-03-15 DIAGNOSIS — K91.2 POSTSURGICAL MALABSORPTION: ICD-10-CM

## 2018-03-15 DIAGNOSIS — Z98.84 BARIATRIC SURGERY STATUS: ICD-10-CM

## 2018-03-15 DIAGNOSIS — R79.89 ELEVATED FERRITIN: ICD-10-CM

## 2018-03-15 LAB
CRP SERPL-MCNC: <2.9 MG/L (ref 0–8)
ERYTHROCYTE [DISTWIDTH] IN BLOOD BY AUTOMATED COUNT: 11.9 % (ref 10–15)
FERRITIN SERPL-MCNC: 105 NG/ML (ref 12–150)
HCT VFR BLD AUTO: 40.6 % (ref 35–47)
HGB BLD-MCNC: 14.3 G/DL (ref 11.7–15.7)
IRON SATN MFR SERPL: 28 % (ref 15–46)
IRON SERPL-MCNC: 83 UG/DL (ref 35–180)
MCH RBC QN AUTO: 33.1 PG (ref 26.5–33)
MCHC RBC AUTO-ENTMCNC: 35.2 G/DL (ref 31.5–36.5)
MCV RBC AUTO: 94 FL (ref 78–100)
PLATELET # BLD AUTO: 297 10E9/L (ref 150–450)
PTH-INTACT SERPL-MCNC: 58 PG/ML (ref 18–80)
RBC # BLD AUTO: 4.32 10E12/L (ref 3.8–5.2)
TIBC SERPL-MCNC: 297 UG/DL (ref 240–430)
VIT B12 SERPL-MCNC: 560 PG/ML (ref 193–986)
WBC # BLD AUTO: 8.5 10E9/L (ref 4–11)

## 2018-03-15 PROCEDURE — 85027 COMPLETE CBC AUTOMATED: CPT | Performed by: PHYSICIAN ASSISTANT

## 2018-03-15 PROCEDURE — 82607 VITAMIN B-12: CPT | Performed by: PHYSICIAN ASSISTANT

## 2018-03-15 PROCEDURE — 86140 C-REACTIVE PROTEIN: CPT | Performed by: PHYSICIAN ASSISTANT

## 2018-03-15 PROCEDURE — 83970 ASSAY OF PARATHORMONE: CPT | Performed by: PHYSICIAN ASSISTANT

## 2018-03-15 PROCEDURE — 82728 ASSAY OF FERRITIN: CPT | Performed by: PHYSICIAN ASSISTANT

## 2018-03-15 PROCEDURE — 82306 VITAMIN D 25 HYDROXY: CPT | Performed by: PHYSICIAN ASSISTANT

## 2018-03-15 PROCEDURE — 83540 ASSAY OF IRON: CPT | Performed by: PHYSICIAN ASSISTANT

## 2018-03-15 PROCEDURE — 36415 COLL VENOUS BLD VENIPUNCTURE: CPT | Performed by: PHYSICIAN ASSISTANT

## 2018-03-15 PROCEDURE — 83550 IRON BINDING TEST: CPT | Performed by: PHYSICIAN ASSISTANT

## 2018-03-16 LAB — DEPRECATED CALCIDIOL+CALCIFEROL SERPL-MC: 63 UG/L (ref 20–75)

## 2018-06-11 ENCOUNTER — RADIANT APPOINTMENT (OUTPATIENT)
Dept: BONE DENSITY | Facility: CLINIC | Age: 43
End: 2018-06-11
Attending: PHYSICIAN ASSISTANT
Payer: COMMERCIAL

## 2018-06-11 DIAGNOSIS — K91.2 MALNUTRITION FOLLOWING GASTROINTESTINAL SURGERY: ICD-10-CM

## 2018-06-11 DIAGNOSIS — Z98.84 BARIATRIC SURGERY STATUS: ICD-10-CM

## 2018-06-11 PROCEDURE — 77080 DXA BONE DENSITY AXIAL: CPT | Performed by: INTERNAL MEDICINE

## 2019-01-15 ENCOUNTER — TELEPHONE (OUTPATIENT)
Dept: FAMILY MEDICINE | Facility: CLINIC | Age: 44
End: 2019-01-15

## 2019-01-15 NOTE — TELEPHONE ENCOUNTER
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  Please fax to [phone] Thank You.

## 2019-01-16 NOTE — TELEPHONE ENCOUNTER
Left message on patients voicemail needs to be seen in clinic to discuss letter, as it has been over a year since seen

## 2019-01-16 NOTE — TELEPHONE ENCOUNTER
I have not seen patient in > 1 year. Last seen in 2017.   Needs to schedule a physical/visit. We can address letter at that time. Thanks

## 2019-01-17 ENCOUNTER — TELEPHONE (OUTPATIENT)
Dept: SURGERY | Facility: CLINIC | Age: 44
End: 2019-01-17

## 2019-01-17 NOTE — LETTER
[facility]          [address]  [address]  [place] [address]                                         Tel:  [phone]  Fax: [phone]    January 21, 2019    No referring provider defined for this encounter.    Regarding:    Name: Monica Mcgee    Address: [address] [place] [id number]    YOB: 1975    To Whom it may concern;    Monica Mcgee had gastric bypass 3/9/16 in order to gain improved health.   After surgery, Monica has worked hard to achieve and maintain a healthy weight and lifestyle. In order to continue to be successful, our program recommends patients maintain a healthy diet, increased activity, and exercise.    A sit to stand dest would allow her to utilize more large muscles and burn more calories than sitting.  Therefore, it would seem prudent for Monica to work at a sit to stand desk to help maintain a healthy lifestyle while at work.       Please feel free to contact our clinic if you have any further questions.    Sincerely,         PATIENCE Banegas/hood

## 2019-01-17 NOTE — TELEPHONE ENCOUNTER
Reason for call:  Other   Patient called regarding (reason for call): call back  Additional comments: Patient would like to have Dr. Alvarez send a note to provider as she is requesting a sit to stand desk due to surgery she has had.   Fax # to send letter [phone]  Attention: Monica Mcgee    Phone number to reach patient:  Cell number on file:    Telephone Information:   Mobile [phone]       Best Time:  anytime    Can we leave a detailed message on this number?  YES

## 2019-01-17 NOTE — LETTER
[facility]          [address]  [address]  [place] [address]                                         Tel:  [phone]  Fax: [phone]    January 21, 2019    No referring provider defined for this encounter.    Regarding:    Name: Monica Mcgee    Address: [address] [place] [id number]    YOB: 1975    To Whom it may concern;    Monica Mcgee had gastric bypass 3/9/16 in order to gain improved health.   After surgery, Monica has worked hard to achieve and maintain a healthy weight and lifestyle. In order to continue to be successful, our program recommends patients maintain a healthy diet, increased activity, and exercise.    A sit to stand dest would allow her to utilize more large muscles than sitting and perhaps burn more calories than sitting.   Therefore, it would seem prudent for Monica to work at a sit to stand desk to help her continue to maintain a healthy lifestyle while at work.       Please feel free to contact our clinic if you have any further questions.    Sincerely,         PATIENCE Banegas/hood

## 2019-01-21 NOTE — TELEPHONE ENCOUNTER
Letter completed and faxed to patient at 1623 today.  LM on patient's VM as directed - letter was faxed.  Cele Rubio, MS, RD, RN

## 2019-03-28 ENCOUNTER — HOSPITAL ENCOUNTER (OUTPATIENT)
Dept: LAB | Facility: CLINIC | Age: 44
Discharge: HOME | End: 2019-03-28
Attending: PHYSICIAN ASSISTANT | Admitting: PHYSICIAN ASSISTANT
Payer: COMMERCIAL

## 2019-03-28 ENCOUNTER — OFFICE VISIT (OUTPATIENT)
Dept: SURGERY | Facility: CLINIC | Age: 44
End: 2019-03-28
Payer: COMMERCIAL

## 2019-03-28 VITALS
WEIGHT: 145.2 LBS | HEART RATE: 81 BPM | OXYGEN SATURATION: 100 % | HEIGHT: 63 IN | SYSTOLIC BLOOD PRESSURE: 129 MMHG | BODY MASS INDEX: 25.73 KG/M2 | DIASTOLIC BLOOD PRESSURE: 86 MMHG

## 2019-03-28 DIAGNOSIS — K91.2 POSTSURGICAL MALABSORPTION: ICD-10-CM

## 2019-03-28 DIAGNOSIS — K59.00 CONSTIPATION, UNSPECIFIED CONSTIPATION TYPE: ICD-10-CM

## 2019-03-28 DIAGNOSIS — Z98.84 BARIATRIC SURGERY STATUS: ICD-10-CM

## 2019-03-28 LAB
ERYTHROCYTE [DISTWIDTH] IN BLOOD BY AUTOMATED COUNT: 11.7 % (ref 10–15)
FERRITIN SERPL-MCNC: 156 NG/ML (ref 12–150)
HCT VFR BLD AUTO: 42.4 % (ref 35–47)
HGB BLD-MCNC: 14.7 G/DL (ref 11.7–15.7)
IRON SATN MFR SERPL: 47 % (ref 15–46)
IRON SERPL-MCNC: 133 UG/DL (ref 35–180)
MCH RBC QN AUTO: 32.8 PG (ref 26.5–33)
MCHC RBC AUTO-ENTMCNC: 34.7 G/DL (ref 31.5–36.5)
MCV RBC AUTO: 95 FL (ref 78–100)
PLATELET # BLD AUTO: 286 10E9/L (ref 150–450)
PTH-INTACT SERPL-MCNC: 26 PG/ML (ref 12–64)
RBC # BLD AUTO: 4.48 10E12/L (ref 3.8–5.2)
TIBC SERPL-MCNC: 283 UG/DL (ref 240–430)
VIT B12 SERPL-MCNC: 809 PG/ML (ref 193–986)
WBC # BLD AUTO: 7 10E9/L (ref 4–11)

## 2019-03-28 PROCEDURE — 83550 IRON BINDING TEST: CPT | Performed by: PHYSICIAN ASSISTANT

## 2019-03-28 PROCEDURE — 99213 OFFICE O/P EST LOW 20 MIN: CPT | Performed by: PHYSICIAN ASSISTANT

## 2019-03-28 PROCEDURE — 83970 ASSAY OF PARATHORMONE: CPT | Performed by: PHYSICIAN ASSISTANT

## 2019-03-28 PROCEDURE — 82728 ASSAY OF FERRITIN: CPT | Performed by: PHYSICIAN ASSISTANT

## 2019-03-28 PROCEDURE — 82306 VITAMIN D 25 HYDROXY: CPT | Performed by: PHYSICIAN ASSISTANT

## 2019-03-28 PROCEDURE — 85027 COMPLETE CBC AUTOMATED: CPT | Performed by: PHYSICIAN ASSISTANT

## 2019-03-28 PROCEDURE — 82607 VITAMIN B-12: CPT | Performed by: PHYSICIAN ASSISTANT

## 2019-03-28 PROCEDURE — 83540 ASSAY OF IRON: CPT | Performed by: PHYSICIAN ASSISTANT

## 2019-03-28 PROCEDURE — 36415 COLL VENOUS BLD VENIPUNCTURE: CPT | Performed by: PHYSICIAN ASSISTANT

## 2019-03-28 ASSESSMENT — MIFFLIN-ST. JEOR: SCORE: 1279.81

## 2019-03-28 NOTE — PROGRESS NOTES
"BARIATRIC FOLLOW UP VISIT     March 28, 2019       HISTORY OF PRESENT ILLNESS:  Pt returns today for her follow-up appointment status post laparoscopic gastric bypass.  Overall she is doing very well.  Since her visit here last year she has changed jobs and has less time to work out.      Initial Weight: 237 lb (107.5 kg)   Current Weight: 145 lb 3.2 oz (65.9 kg)  Cumulative weight loss (lbs): 91.8  Last Visits Weight: 144 lb 6.4 oz (65.5 kg)     Patient is taking the following bariatric postoperative vitamins:  2 Complete multivitamins with minerals (at different times than calcium)   2000 International Units of Vitamin D daily  1200 mg of Calcium daily in divided doses  Vitamin B12 injection once monthly  1 No iron per protocol    Pt is not exercising due to career change and works 10 hour days.  On the weekends she is very tired.       OBESITY RELATED CONDITIONS:  Diabetes - resolved       SOCIAL HISTORY:  Pt denies smoking.  Pt drinks alcohol 2-4 drinks monthly.  Avoids NSAIDS.  Drinking  4 - Eight oz cups of coffee.        REVIEW OF SYSTEMS:     GI:  Nausea- N  Vomiting- N  Diarrhea- N  Constipation-Yes.  Having BM 4-5 weekly. Takes docusate every night and fish oil.  About 48 oz water daily  Dysphagia- N  Abdominal Pain- N  Heartburn- N     SKIN:  Intertriginous irritation- had panniculectomy, brachipexy, and breast lift       PSYCH:  Depression- N  Anxiety- some. manageable      LABS/IMAGING/MEDICAL RECORDS REVIEW: last set of labs.  Ferritin level in 2018 was within normal range.  Reviewed bone density scan from 6/2018 and WNL    PHYSICAL EXAMINATION:   /86 (BP Location: Left arm, Patient Position: Sitting, Cuff Size: Adult Regular)   Pulse 81   Ht 5' 2.5\" (1.588 m)   Wt 145 lb 3.2 oz (65.9 kg)   SpO2 100%   BMI 26.13 kg/m      GENERAL: Alert and oriented x3. NAD  HEART: No murmurs, rubs or gallops, Regular rate and rhythm  LUNGS: Breathing unlabored, Lung sounds clear to auscultation " bilaterally  ABDOMEN: soft; nontender; nondistended, incision well healed. No hernia  EXTREMITIES: No LE edema bilaterally, Gait normal  SKIN: No intertriginous irritation or rash      ASSESSMENT AND PLAN:      3 years status laparoscopic gastric bypass  Morbid Obesity - resolved   Body mass index is 26.13 kg/m ..  Post surgical malabsorption:   Ordered CBC, vitamin B12, vitamin D, PTH, ferritin, TIBC, and iron labs.   Follow food plan per dietitian recommendations.   Continue taking recommended post-op vitamins.  Discussed starting a vitamin B12 complex  Recommend stop caffeine - trying substitute for decaf  Constipation - continue current regime  Return to clinic in 1 year      I spent a total of 15 minutes face to face with Monica during today's office visit. Over 50% of this time was spent counseling the patient and/or coordinating care.

## 2019-03-28 NOTE — PATIENT INSTRUCTIONS
3 years status laparoscopic gastric bypass  Morbid Obesity - resolved   Body mass index is 26.13 kg/m .  Post surgical malabsorption:              Ordered CBC, vitamin B12, vitamin D, PTH, ferritin, TIBC, and iron labs.              Follow food plan per dietitian recommendations.              Continue taking recommended post-op vitamins.  Discussed starting a vitamin B12 complex  Recommend stop caffeine - trying substitute for decaf  Constipation - continue current regime  Return to clinic in 1 year

## 2019-03-29 LAB — DEPRECATED CALCIDIOL+CALCIFEROL SERPL-MC: 86 UG/L (ref 20–75)

## 2019-04-02 DIAGNOSIS — Z98.84 BARIATRIC SURGERY STATUS: ICD-10-CM

## 2019-04-02 DIAGNOSIS — K91.2 POSTSURGICAL MALABSORPTION: ICD-10-CM

## 2019-10-03 ENCOUNTER — OFFICE VISIT (OUTPATIENT)
Dept: FAMILY MEDICINE | Facility: CLINIC | Age: 44
End: 2019-10-03
Payer: COMMERCIAL

## 2019-10-03 VITALS
WEIGHT: 153.8 LBS | HEART RATE: 82 BPM | DIASTOLIC BLOOD PRESSURE: 85 MMHG | TEMPERATURE: 97.3 F | OXYGEN SATURATION: 98 % | RESPIRATION RATE: 18 BRPM | SYSTOLIC BLOOD PRESSURE: 127 MMHG | BODY MASS INDEX: 27.68 KG/M2

## 2019-10-03 DIAGNOSIS — J20.9 ACUTE BRONCHITIS WITH SYMPTOMS > 10 DAYS: Primary | ICD-10-CM

## 2019-10-03 DIAGNOSIS — R07.0 THROAT PAIN: ICD-10-CM

## 2019-10-03 LAB
DEPRECATED S PYO AG THROAT QL EIA: NORMAL
SPECIMEN SOURCE: NORMAL

## 2019-10-03 PROCEDURE — 99213 OFFICE O/P EST LOW 20 MIN: CPT | Performed by: FAMILY MEDICINE

## 2019-10-03 PROCEDURE — 87081 CULTURE SCREEN ONLY: CPT | Performed by: FAMILY MEDICINE

## 2019-10-03 PROCEDURE — 87880 STREP A ASSAY W/OPTIC: CPT | Performed by: FAMILY MEDICINE

## 2019-10-03 RX ORDER — ALBUTEROL SULFATE 90 UG/1
2 AEROSOL, METERED RESPIRATORY (INHALATION) EVERY 4 HOURS PRN
Qty: 18 G | Refills: 0 | Status: SHIPPED | OUTPATIENT
Start: 2019-10-03 | End: 2020-01-08

## 2019-10-03 RX ORDER — AZITHROMYCIN 250 MG/1
TABLET, FILM COATED ORAL
Qty: 6 TABLET | Refills: 0 | Status: SHIPPED | OUTPATIENT
Start: 2019-10-03 | End: 2019-10-14

## 2019-10-03 NOTE — PATIENT INSTRUCTIONS

## 2019-10-03 NOTE — PROGRESS NOTES
"  SUBJECTIVE:  Monica Mcgee is a 44 year old female who presents with the following concerns;              Symptoms: cc Present Absent Comment   Fever/Chills   x    Fatigue  x     Muscle Aches   x    Eye Irritation   x    Sneezing   x    Nasal Chandra/Drg  x  Congestion    Sinus Pressure/Pain  x     Loss of smell   x    Dental pain   x    Sore Throat  x     Swollen Glands   x    Ear Pain/Fullness  x  Ear fullness    Cough  x     Wheeze   x    Chest Pain  x  Chest tightness    Shortness of breath   x    Rash   x    Other  x  Headaches- fullness in head      Symptom duration:  x8-10  days    Sympom severity:  same   Treatments tried:  zicam this morning    Contacts:  none      HEALTH CARE MAINTENANCE: Overdue for a physical and diabetes check    Medications updated and reviewed.  Current Outpatient Medications   Medication     albuterol (PROAIR HFA/PROVENTIL HFA/VENTOLIN HFA) 108 (90 Base) MCG/ACT inhaler     azithromycin (ZITHROMAX) 250 MG tablet     Calcium-Phosphorus-Vitamin D (CITRACAL +D3 PO)     Cholecalciferol (VITAMIN D3 PO)     cyanocobalamin (CYANOCOBALAMIN) 1000 MCG/ML injection     Docusate Sodium (COLACE PO)     Docusate Sodium (DOCUSOFT-S PO)     Lactobacillus (PROBIOTIC ACIDOPHILUS PO)     levonorgestrel (MIRENA) 20 MCG/24HR IUD     multivitamin  peds with iron (FLINTSTONES COMPLETE) 60 MG chewable tablet     Omega-3 Fatty Acids (FISH OIL PO)     sennosides (SENOKOT) 8.6 MG tablet     syringe/needle, disp, (BD INTEGRA SYRINGE) 25G X 1\" 3 ML MISC     No current facility-administered medications for this visit.        Past, family and surgical history is updated and reviewed in the record.    ROS:  Other than noted above, general, HEENT, respiratory, cardiac and gastrointestinal systems are negative.    OBJECTIVE:  /85   Pulse 82   Temp 97.3  F (36.3  C) (Tympanic)   Resp 18   Wt 69.8 kg (153 lb 12.8 oz)   SpO2 98%   Breastfeeding? Yes   BMI 27.68 kg/m  ;  GENERAL:  Alert, no acute " distress  EYES:  PERRL, EOM normal, conjunctiva and lids normal  HEENT:  Ears and TMs normal, oral mucosa and posterior oropharynx normal  RESP:  Lungs clear to auscultation. No wheezing or crackles. Normal respiratory effort.   CV: normal rate, regular rhythm, no murmur or gallop.    DATA  Reviewed and discussed with patient prior to discharge.  Results for orders placed or performed in visit on 10/03/19   Strep, Rapid Screen   Result Value Ref Range    Specimen Description Throat     Rapid Strep A Screen       NEGATIVE: No Group A streptococcal antigen detected by immunoassay, await culture report.       Assessment/Plan:     Monica was seen today for pharyngitis.    Diagnoses and all orders for this visit:    Acute bronchitis with symptoms > 10 days  -     azithromycin (ZITHROMAX) 250 MG tablet; Two tablets first day, then one tablet daily for four days.  -     albuterol (PROAIR HFA/PROVENTIL HFA/VENTOLIN HFA) 108 (90 Base) MCG/ACT inhaler; Inhale 2 puffs into the lungs every 4 hours as needed for shortness of breath / dyspnea or wheezing    Throat pain  -     Strep, Rapid Screen  -     Gargling with warm salt water will also reduce throat pain. Dissolve 1/2 teaspoon of salt in 1 glass of warm water. This may be useful just before meals.      Patient education and Handout with home care instructions given. See AVS for details.       Follow up if symptoms fail to improve in 1 week or worsen.      The patient was in agreement with the plan today and had no questions or concerns prior to leaving the clinic.    Schedule a Physical Exam at earliest convenience.       Shaila Flores M.D    [facility]

## 2019-10-04 LAB
BACTERIA SPEC CULT: NORMAL
SPECIMEN SOURCE: NORMAL

## 2019-10-12 ENCOUNTER — NURSE TRIAGE (OUTPATIENT)
Dept: NURSING | Facility: CLINIC | Age: 44
End: 2019-10-12

## 2019-10-12 NOTE — TELEPHONE ENCOUNTER
"\"Can you leave a message for Dr. Flores, I'm not a fan of MyChart\".      Monica is reporting that she is not feeling any better after being seen for acute bronchitis and placed on z-pack.  Advised caller that per plan of care from Office Visit on October 3rd, caller needed to follow up with clinic if symptoms fail to improve in 1 week or worsen.    Offered caller triage and/or transfer to scheduling to make an appointment for Monday per plan of care from previous office visit.  Caller declined triage, said she'd like to make a follow up appointment for Monday.    Warm transfer to scheduling to make an appointment.    Anny Koo RN  [facility]      "

## 2019-10-14 ENCOUNTER — OFFICE VISIT (OUTPATIENT)
Dept: FAMILY MEDICINE | Facility: CLINIC | Age: 44
End: 2019-10-14
Payer: COMMERCIAL

## 2019-10-14 ENCOUNTER — ANCILLARY PROCEDURE (OUTPATIENT)
Dept: GENERAL RADIOLOGY | Facility: CLINIC | Age: 44
End: 2019-10-14
Attending: PHYSICIAN ASSISTANT
Payer: COMMERCIAL

## 2019-10-14 VITALS
WEIGHT: 153 LBS | HEIGHT: 63 IN | HEART RATE: 75 BPM | RESPIRATION RATE: 18 BRPM | OXYGEN SATURATION: 97 % | TEMPERATURE: 98.3 F | BODY MASS INDEX: 27.11 KG/M2 | DIASTOLIC BLOOD PRESSURE: 84 MMHG | SYSTOLIC BLOOD PRESSURE: 131 MMHG

## 2019-10-14 DIAGNOSIS — R05.9 COUGH: Primary | ICD-10-CM

## 2019-10-14 DIAGNOSIS — J20.9 ACUTE BRONCHITIS, UNSPECIFIED ORGANISM: ICD-10-CM

## 2019-10-14 PROCEDURE — 71046 X-RAY EXAM CHEST 2 VIEWS: CPT

## 2019-10-14 PROCEDURE — 99213 OFFICE O/P EST LOW 20 MIN: CPT | Performed by: PHYSICIAN ASSISTANT

## 2019-10-14 RX ORDER — BENZONATATE 200 MG/1
200 CAPSULE ORAL 3 TIMES DAILY PRN
Qty: 30 CAPSULE | Refills: 1 | Status: SHIPPED | OUTPATIENT
Start: 2019-10-14 | End: 2020-01-08

## 2019-10-14 RX ORDER — PREDNISONE 20 MG/1
20 TABLET ORAL 2 TIMES DAILY
Qty: 10 TABLET | Refills: 0 | Status: SHIPPED | OUTPATIENT
Start: 2019-10-14 | End: 2020-01-08

## 2019-10-14 ASSESSMENT — MIFFLIN-ST. JEOR: SCORE: 1310.19

## 2019-10-14 NOTE — PROGRESS NOTES
"Subjective     Monica Mcgee is a 44 year old female who presents to clinic today for the following health issues:    HPI   RESPIRATORY SYMPTOMS      Duration: 3 weeks    Description  nasal congestion and dry cough    Severity: moderate    Accompanying signs and symptoms: None    History (predisposing factors):  none    Precipitating or alleviating factors: None    Therapies tried and outcome:  zithromax and inhaler with no relief        Current Outpatient Medications   Medication Sig Dispense Refill     albuterol (PROAIR HFA/PROVENTIL HFA/VENTOLIN HFA) 108 (90 Base) MCG/ACT inhaler Inhale 2 puffs into the lungs every 4 hours as needed for shortness of breath / dyspnea or wheezing 18 g 0     Calcium-Phosphorus-Vitamin D (CITRACAL +D3 PO) Take 600 mg by mouth 2 times daily        Cholecalciferol (VITAMIN D3 PO) Take 1,000 Units by mouth 2 times daily        cyanocobalamin (CYANOCOBALAMIN) 1000 MCG/ML injection INJECT 1 ML SUBCUTANEOUSLY EVERY 30 DAYS 1 mL 2     Docusate Sodium (COLACE PO) Take by mouth 2 times daily       Docusate Sodium (DOCUSOFT-S PO)        Lactobacillus (PROBIOTIC ACIDOPHILUS PO)        levonorgestrel (MIRENA) 20 MCG/24HR IUD 1 each (20 mcg) by Intrauterine route once for 1 dose 1 each 0     Omega-3 Fatty Acids (FISH OIL PO)        sennosides (SENOKOT) 8.6 MG tablet Take 1 tablet by mouth At Bedtime       syringe/needle, disp, (BD INTEGRA SYRINGE) 25G X 1\" 3 ML MISC USE FOR B-12 INJECTION 3 each 3     multivitamin  peds with iron (FLINTSTONES COMPLETE) 60 MG chewable tablet Take 2 chew tab by mouth daily (with lunch)        No Known Allergies  BP Readings from Last 3 Encounters:   10/14/19 131/84   10/03/19 127/85   03/28/19 129/86    Wt Readings from Last 3 Encounters:   10/14/19 69.4 kg (153 lb)   10/03/19 69.8 kg (153 lb 12.8 oz)   03/28/19 65.9 kg (145 lb 3.2 oz)                      Reviewed and updated as needed this visit by Provider         Review of Systems   ROS COMP: " "Constitutional, HEENT, cardiovascular, pulmonary, GI, , musculoskeletal, neuro, skin, endocrine and psych systems are negative, except as otherwise noted.      Objective    /84   Pulse 75   Temp 98.3  F (36.8  C) (Tympanic)   Resp 18   Ht 1.588 m (5' 2.5\")   Wt 69.4 kg (153 lb)   SpO2 97%   BMI 27.54 kg/m    Body mass index is 27.54 kg/m .  Physical Exam   Eye exam - right eye normal lid, conjunctiva, cornea, pupil and fundus, left eye normal lid, conjunctiva, cornea, pupil and fundus.  ENT exam reveals - bilateral TM normal without fluid or infection, bilateral TM fluid noted, neck without nodes, throat normal without erythema or exudate, post nasal drip noted, nasal mucosa congested and nasal mucosa pale and congested.  CHEST:chest clear to IPPA, no tachypnea, retractions or cyanosis and S1, S2 normal, no murmur, no gallop, rate regular.      Monica was seen today for cough.    Diagnoses and all orders for this visit:    Cough  -     XR Chest 2 Views    Acute bronchitis, unspecified organism  -     XR Chest 2 Views  -     benzonatate (TESSALON) 200 MG capsule; Take 1 capsule (200 mg) by mouth 3 times daily as needed for cough  -     predniSONE (DELTASONE) 20 MG tablet; Take 1 tablet (20 mg) by mouth 2 times daily for 5 days      Advised supportive and symptomatic treatment.  Follow up with Provider - if condition persists or worsens.     "

## 2019-10-18 ENCOUNTER — TELEPHONE (OUTPATIENT)
Dept: FAMILY MEDICINE | Facility: CLINIC | Age: 44
End: 2019-10-18

## 2019-10-18 NOTE — TELEPHONE ENCOUNTER
Panel Management Review      Patient has the following on her problem list:   Diabetes    ASA: Passed    Last A1C  Lab Results   Component Value Date    A1C 4.9 09/15/2017    A1C 5.0 06/09/2016    A1C 6.8 03/10/2016    A1C 7.9 12/17/2015    A1C 6.1 05/27/2015     A1C tested: Passed    Last LDL:    Lab Results   Component Value Date    CHOL 143 09/15/2017     Lab Results   Component Value Date    HDL 65 09/15/2017     Lab Results   Component Value Date    LDL 63 09/15/2017     Lab Results   Component Value Date    TRIG 76 09/15/2017     No results found for: CHOLHDLRATIO  Lab Results   Component Value Date    NHDL 78 09/15/2017       Is the patient on a Statin? NO             Is the patient on Aspirin? NO        Last three blood pressure readings:  BP Readings from Last 3 Encounters:   10/14/19 131/84   10/03/19 127/85   03/28/19 129/86       Date of last diabetes office visit: NA- Pt is no longer diabetic      Tobacco History:     History   Smoking Status     Never Smoker   Smokeless Tobacco     Never Used           Composite cancer screening  Chart review shows that this patient is due/due soon for the following None  Summary:    Patient is due/failing the following:   A1C    Action needed:    **Provider must resolve diabetes on problem list**    Type of outreach:    none    Questions for provider review:    **Provider must resolve diabetes on problem list**                                                                                                                                    Katerin Doss MA       Chart routed to Provider .

## 2019-10-19 DIAGNOSIS — Z98.84 BARIATRIC SURGERY STATUS: ICD-10-CM

## 2019-10-23 NOTE — TELEPHONE ENCOUNTER
Called pt to verify status of need. Noted that rx is over year old for syringes.  On 4/16/19 only 3 month supply of Vitamin B-12 medication written.  Pt states that she has missed some doses on this and is out of both her Vitamin B-12 and syringes.  Would like both refilled for her please.    Due for annual again March 2020.    Will forward to PATIENCE Meyer RN on 10/23/2019 at 12:32 PM

## 2019-10-25 RX ORDER — CYANOCOBALAMIN 1000 UG/ML
INJECTION, SOLUTION INTRAMUSCULAR; SUBCUTANEOUS
Qty: 3 ML | Refills: 1 | Status: SHIPPED | OUTPATIENT
Start: 2019-10-25 | End: 2020-10-27

## 2019-11-05 ENCOUNTER — OFFICE VISIT (OUTPATIENT)
Dept: FAMILY MEDICINE | Facility: CLINIC | Age: 44
End: 2019-11-05
Payer: COMMERCIAL

## 2019-11-05 VITALS
RESPIRATION RATE: 18 BRPM | WEIGHT: 154.2 LBS | SYSTOLIC BLOOD PRESSURE: 131 MMHG | HEART RATE: 76 BPM | TEMPERATURE: 98.7 F | OXYGEN SATURATION: 99 % | DIASTOLIC BLOOD PRESSURE: 84 MMHG | BODY MASS INDEX: 27.75 KG/M2

## 2019-11-05 DIAGNOSIS — J20.9 BRONCHITIS, SUBACUTE: Primary | ICD-10-CM

## 2019-11-05 DIAGNOSIS — J98.01 BRONCHOSPASM: ICD-10-CM

## 2019-11-05 PROCEDURE — 99213 OFFICE O/P EST LOW 20 MIN: CPT | Performed by: PHYSICIAN ASSISTANT

## 2019-11-05 RX ORDER — DOXYCYCLINE 100 MG/1
100 CAPSULE ORAL 2 TIMES DAILY
Qty: 20 CAPSULE | Refills: 0 | Status: SHIPPED | OUTPATIENT
Start: 2019-11-05 | End: 2020-01-08

## 2019-11-05 ASSESSMENT — ANXIETY QUESTIONNAIRES
6. BECOMING EASILY ANNOYED OR IRRITABLE: MORE THAN HALF THE DAYS
GAD7 TOTAL SCORE: 11
IF YOU CHECKED OFF ANY PROBLEMS ON THIS QUESTIONNAIRE, HOW DIFFICULT HAVE THESE PROBLEMS MADE IT FOR YOU TO DO YOUR WORK, TAKE CARE OF THINGS AT HOME, OR GET ALONG WITH OTHER PEOPLE: SOMEWHAT DIFFICULT
7. FEELING AFRAID AS IF SOMETHING AWFUL MIGHT HAPPEN: SEVERAL DAYS
2. NOT BEING ABLE TO STOP OR CONTROL WORRYING: SEVERAL DAYS
1. FEELING NERVOUS, ANXIOUS, OR ON EDGE: MORE THAN HALF THE DAYS
5. BEING SO RESTLESS THAT IT IS HARD TO SIT STILL: SEVERAL DAYS
3. WORRYING TOO MUCH ABOUT DIFFERENT THINGS: MORE THAN HALF THE DAYS

## 2019-11-05 ASSESSMENT — PATIENT HEALTH QUESTIONNAIRE - PHQ9
SUM OF ALL RESPONSES TO PHQ QUESTIONS 1-9: 8
5. POOR APPETITE OR OVEREATING: MORE THAN HALF THE DAYS

## 2019-11-05 NOTE — PATIENT INSTRUCTIONS
Start using the albuterol inhaler 3 times daily x7-14 days.   Start and finish the doxycycline.     For the next 1 week take a cough suppressant every morning:  Tessalon 200mg OR Delsym/dextromethorphan.

## 2019-11-05 NOTE — LETTER
My Depression Action Plan  Name: Monica Mcgee   Date of Birth 1975  Date: 11/5/2019    My doctor: Shaila Flores   My clinic: [facility] [place] [id number] [facility]  [place] [id number] [phone]          GREEN    ZONE   Good Control    What it looks like:     Things are going generally well. You have normal up s and down s. You may even feel depressed from time to time, but bad moods usually last less than a day.   What you need to do:  1. Continue to care for yourself (see self care plan)  2. Check your depression survival kit and update it as needed  3. Follow your physician s recommendations including any medication.  4. Do not stop taking medication unless you consult with your physician first.           YELLOW         ZONE Getting Worse    What it looks like:     Depression is starting to interfere with your life.     It may be hard to get out of bed; you may be starting to isolate yourself from others.    Symptoms of depression are starting to last most all day and this has happened for several days.     You may have suicidal thoughts but they are not constant.   What you need to do:     1. Call your care team, your response to treatment will improve if you keep your care team informed of your progress. Yellow periods are signs an adjustment may need to be made.     2. Continue your self-care, even if you have to fake it!    3. Talk to someone in your support network    4. Open up your depression survival kit           RED    ZONE Medical Alert - Get Help    What it looks like:     Depression is seriously interfering with your life.     You may experience these or other symptoms: You can t get out of bed most days, can t work or engage in other necessary activities, you have trouble taking care of basic hygiene, or basic responsibilities, thoughts of suicide or death that will not go away, self-injurious behavior.     What you need to do:  1. Call your care team and  request a same-day appointment. If they are not available (weekends or after hours) call your local crisis line, emergency room or 911.            Depression Self Care Plan / Survival Kit    Self-Care for Depression  Here s the deal. Your body and mind are really not as separate as most people think.  What you do and think affects how you feel and how you feel influences what you do and think. This means if you do things that people who feel good do, it will help you feel better.  Sometimes this is all it takes.  There is also a place for medication and therapy depending on how severe your depression is, so be sure to consult with your medical provider and/ or Behavioral Health Consultant if your symptoms are worsening or not improving.     In order to better manage my stress, I will:    Exercise  Get some form of exercise, every day. This will help reduce pain and release endorphins, the  feel good  chemicals in your brain. This is almost as good as taking antidepressants!  This is not the same as joining a gym and then never going! (they count on that by the way ) It can be as simple as just going for a walk or doing some gardening, anything that will get you moving.      Hygiene   Maintain good hygiene (Get out of bed in the morning, Make your bed, Brush your teeth, Take a shower, and Get dressed like you were going to work, even if you are unemployed).  If your clothes don't fit try to get ones that do.    Diet  I will strive to eat foods that are good for me, drink plenty of water, and avoid excessive sugar, caffeine, alcohol, and other mood-altering substances.  Some foods that are helpful in depression are: complex carbohydrates, B vitamins, flaxseed, fish or fish oil, fresh fruits and vegetables.    Psychotherapy  I agree to participate in Individual Therapy (if recommended).    Medication  If prescribed medications, I agree to take them.  Missing doses can result in serious side effects.  I understand that  drinking alcohol, or other illicit drug use, may cause potential side effects.  I will not stop my medication abruptly without first discussing it with my provider.    Staying Connected With Others  I will stay in touch with my friends, family members, and my primary care provider/team.    Use your imagination  Be creative.  We all have a creative side; it doesn t matter if it s oil painting, sand castles, or mud pies! This will also kick up the endorphins.    Witness Beauty  (AKA stop and smell the roses) Take a look outside, even in mid-winter. Notice colors, textures. Watch the squirrels and birds.     Service to others  Be of service to others.  There is always someone else in need.  By helping others we can  get out of ourselves  and remember the really important things.  This also provides opportunities for practicing all the other parts of the program.    Humor  Laugh and be silly!  Adjust your TV habits for less news and crime-drama and more comedy.    Control your stress  Try breathing deep, massage therapy, biofeedback, and meditation. Find time to relax each day.     My support system    Clinic Contact:  Phone number:    Contact 1:  Phone number:    Contact 2:  Phone number:    Jew/:  Phone number:    Therapist:  Phone number:    Local crisis center:    Phone number:    Other community support:  Phone number:

## 2019-11-05 NOTE — PROGRESS NOTES
SUBJECTIVE:  Monica Mcgee is a 44 year old female who presents with the following concerns;              Symptoms: cc Present Absent Comment   Fever/Chills   x    Fatigue  x     Muscle Aches   x    Eye Irritation   x    Sneezing   x    Nasal Chandra/Drg   x    Sinus Pressure/Pain  x     Loss of smell   x    Dental pain   x    Sore Throat   x    Swollen Glands    Not sure   Ear Pain/Fullness  x  Fullness both ears   Cough  x     Wheeze  x  occ   Chest Pain  x     Shortness of breath  x     Rash   x    Other  x  Seen 10/14/2019 for cough with Jc and 10/03/2019 with Dr. Flores      Symptom duration:  x6wks   Sympom severity:  same   Treatments tried:  nothing in the last 2wks   Contacts:  none       Seen on 10/3 by Dr. Flores: RST neg, Z-pack, albuterol  Seen on 10/14 by Miky Luciano: CXR neg, Prednisone, Tessalon      Medications updated and reviewed.  Past, family and surgical history is updated and reviewed in the record.    ROS:  Other than noted above, general, HEENT, respiratory, cardiac and gastrointestinal systems are negative.    OBJECTIVE:  /84   Pulse 76   Temp 98.7  F (37.1  C) (Tympanic)   Resp 18   Wt 69.9 kg (154 lb 3.2 oz)   SpO2 99%   BMI 27.75 kg/m    GENERAL: Pleasant and interactive. No acute distress.  HEENT: Mild injection of conjunctiva.  TMs clear. Oropharynx moist and clear.   NECK: supple and free of adenopathy or masses, the thyroid is normal without enlargement or nodules.  CHEST:  clear, no wheezing or rales. Normal symmetric air entry throughout both lung fields. No chest wall deformities or tenderness.  HEART:  S1 and S2 normal, no murmurs, clicks, gallops or rubs. Regular rate and rhythm.  SKIN:  Only benign skin findings. No unusual rashes or suspicious skin lesions noted. Nails appear normal.    Assessment:    Encounter Diagnoses   Name Primary?     Bronchitis, subacute Yes     Bronchospasm      Plan:   Orders Placed This Encounter     DEPRESSION ACTION PLAN (DAP)      doxycycline hyclate (VIBRAMYCIN) 100 MG capsule       Will treat for subacute bronchitis with doxycycline. I also asked her to use her albuterol regularly for at least a week. If no improvements may need to look at her sinuses.    Patient Instructions   Start using the albuterol inhaler 3 times daily x7-14 days.   Start and finish the doxycycline.     For the next 1 week take a cough suppressant every morning:  Tessalon 200mg OR Delsym/dextromethorphan.       The patient was in agreement with the plan today and had no questions or concerns prior to leaving the clinic.     Jazmin Abad PA-C

## 2019-11-06 DIAGNOSIS — E78.5 HYPERLIPIDEMIA LDL GOAL <100: Chronic | ICD-10-CM

## 2019-11-06 DIAGNOSIS — K91.2 POSTSURGICAL MALABSORPTION: ICD-10-CM

## 2019-11-06 DIAGNOSIS — Z86.39 HISTORY OF DIABETES MELLITUS, TYPE II: ICD-10-CM

## 2019-11-06 DIAGNOSIS — Z98.84 BARIATRIC SURGERY STATUS: ICD-10-CM

## 2019-11-06 LAB
DEPRECATED CALCIDIOL+CALCIFEROL SERPL-MC: 58 UG/L (ref 20–75)
FERRITIN SERPL-MCNC: 99 NG/ML (ref 12–150)
IRON SATN MFR SERPL: 43 % (ref 15–46)
IRON SERPL-MCNC: 126 UG/DL (ref 35–180)
PTH-INTACT SERPL-MCNC: 28 PG/ML (ref 18–80)
TIBC SERPL-MCNC: 295 UG/DL (ref 240–430)

## 2019-11-06 PROCEDURE — 82728 ASSAY OF FERRITIN: CPT | Performed by: PHYSICIAN ASSISTANT

## 2019-11-06 PROCEDURE — 36415 COLL VENOUS BLD VENIPUNCTURE: CPT | Performed by: PHYSICIAN ASSISTANT

## 2019-11-06 PROCEDURE — 83036 HEMOGLOBIN GLYCOSYLATED A1C: CPT | Performed by: FAMILY MEDICINE

## 2019-11-06 PROCEDURE — 82043 UR ALBUMIN QUANTITATIVE: CPT | Performed by: FAMILY MEDICINE

## 2019-11-06 PROCEDURE — 83550 IRON BINDING TEST: CPT | Performed by: PHYSICIAN ASSISTANT

## 2019-11-06 PROCEDURE — 83970 ASSAY OF PARATHORMONE: CPT | Performed by: PHYSICIAN ASSISTANT

## 2019-11-06 PROCEDURE — 83540 ASSAY OF IRON: CPT | Performed by: PHYSICIAN ASSISTANT

## 2019-11-06 PROCEDURE — 84443 ASSAY THYROID STIM HORMONE: CPT | Performed by: FAMILY MEDICINE

## 2019-11-06 PROCEDURE — 85027 COMPLETE CBC AUTOMATED: CPT | Performed by: FAMILY MEDICINE

## 2019-11-06 PROCEDURE — 82306 VITAMIN D 25 HYDROXY: CPT | Performed by: PHYSICIAN ASSISTANT

## 2019-11-06 PROCEDURE — 80061 LIPID PANEL: CPT | Performed by: FAMILY MEDICINE

## 2019-11-06 PROCEDURE — 80053 COMPREHEN METABOLIC PANEL: CPT | Performed by: FAMILY MEDICINE

## 2019-11-06 ASSESSMENT — ANXIETY QUESTIONNAIRES: GAD7 TOTAL SCORE: 11

## 2019-11-07 LAB
ALBUMIN SERPL-MCNC: 3.8 G/DL (ref 3.4–5)
ALP SERPL-CCNC: 39 U/L (ref 40–150)
ALT SERPL W P-5'-P-CCNC: 36 U/L (ref 0–50)
ANION GAP SERPL CALCULATED.3IONS-SCNC: 5 MMOL/L (ref 3–14)
AST SERPL W P-5'-P-CCNC: 22 U/L (ref 0–45)
BILIRUB SERPL-MCNC: 0.5 MG/DL (ref 0.2–1.3)
BUN SERPL-MCNC: 17 MG/DL (ref 7–30)
CALCIUM SERPL-MCNC: 9.1 MG/DL (ref 8.5–10.1)
CHLORIDE SERPL-SCNC: 106 MMOL/L (ref 94–109)
CHOLEST SERPL-MCNC: 188 MG/DL
CO2 SERPL-SCNC: 28 MMOL/L (ref 20–32)
CREAT SERPL-MCNC: 0.7 MG/DL (ref 0.52–1.04)
CREAT UR-MCNC: 159 MG/DL
ERYTHROCYTE [DISTWIDTH] IN BLOOD BY AUTOMATED COUNT: 12.2 % (ref 10–15)
GFR SERPL CREATININE-BSD FRML MDRD: >90 ML/MIN/{1.73_M2}
GLUCOSE SERPL-MCNC: 95 MG/DL (ref 70–99)
HBA1C MFR BLD: 4.9 % (ref 0–5.6)
HCT VFR BLD AUTO: 42.3 % (ref 35–47)
HDLC SERPL-MCNC: 79 MG/DL
HGB BLD-MCNC: 14.4 G/DL (ref 11.7–15.7)
LDLC SERPL CALC-MCNC: 98 MG/DL
MCH RBC QN AUTO: 33.2 PG (ref 26.5–33)
MCHC RBC AUTO-ENTMCNC: 34 G/DL (ref 31.5–36.5)
MCV RBC AUTO: 98 FL (ref 78–100)
MICROALBUMIN UR-MCNC: 7 MG/L
MICROALBUMIN/CREAT UR: 4.21 MG/G CR (ref 0–25)
NONHDLC SERPL-MCNC: 109 MG/DL
PLATELET # BLD AUTO: 249 10E9/L (ref 150–450)
POTASSIUM SERPL-SCNC: 4.2 MMOL/L (ref 3.4–5.3)
PROT SERPL-MCNC: 7.1 G/DL (ref 6.8–8.8)
RBC # BLD AUTO: 4.34 10E12/L (ref 3.8–5.2)
SODIUM SERPL-SCNC: 139 MMOL/L (ref 133–144)
TRIGL SERPL-MCNC: 55 MG/DL
TSH SERPL DL<=0.005 MIU/L-ACNC: 0.84 MU/L (ref 0.4–4)
WBC # BLD AUTO: 4.5 10E9/L (ref 4–11)

## 2020-01-08 ENCOUNTER — OFFICE VISIT (OUTPATIENT)
Dept: FAMILY MEDICINE | Facility: CLINIC | Age: 45
End: 2020-01-08
Payer: COMMERCIAL

## 2020-01-08 VITALS
SYSTOLIC BLOOD PRESSURE: 127 MMHG | HEIGHT: 62 IN | BODY MASS INDEX: 28.41 KG/M2 | RESPIRATION RATE: 16 BRPM | TEMPERATURE: 98 F | WEIGHT: 154.4 LBS | DIASTOLIC BLOOD PRESSURE: 84 MMHG | OXYGEN SATURATION: 98 % | HEART RATE: 76 BPM

## 2020-01-08 DIAGNOSIS — Z12.31 ENCOUNTER FOR SCREENING MAMMOGRAM FOR BREAST CANCER: ICD-10-CM

## 2020-01-08 DIAGNOSIS — Z98.84 BARIATRIC SURGERY STATUS: ICD-10-CM

## 2020-01-08 DIAGNOSIS — R05.3 PERSISTENT COUGH FOR 3 WEEKS OR LONGER: ICD-10-CM

## 2020-01-08 DIAGNOSIS — E11.9 CONTROLLED TYPE 2 DIABETES MELLITUS WITHOUT COMPLICATION, WITHOUT LONG-TERM CURRENT USE OF INSULIN (H): ICD-10-CM

## 2020-01-08 DIAGNOSIS — R21 RASH AND NONSPECIFIC SKIN ERUPTION: ICD-10-CM

## 2020-01-08 DIAGNOSIS — Z00.00 ROUTINE GENERAL MEDICAL EXAMINATION AT A HEALTH CARE FACILITY: Primary | ICD-10-CM

## 2020-01-08 LAB
FEF 25/75: NORMAL
FEV-1: NORMAL
FEV1/FVC: NORMAL
FVC: NORMAL

## 2020-01-08 PROCEDURE — 36415 COLL VENOUS BLD VENIPUNCTURE: CPT | Performed by: FAMILY MEDICINE

## 2020-01-08 PROCEDURE — 99396 PREV VISIT EST AGE 40-64: CPT | Mod: 25 | Performed by: FAMILY MEDICINE

## 2020-01-08 PROCEDURE — 82785 ASSAY OF IGE: CPT | Performed by: FAMILY MEDICINE

## 2020-01-08 PROCEDURE — 99213 OFFICE O/P EST LOW 20 MIN: CPT | Mod: 25 | Performed by: FAMILY MEDICINE

## 2020-01-08 PROCEDURE — 86003 ALLG SPEC IGE CRUDE XTRC EA: CPT | Performed by: FAMILY MEDICINE

## 2020-01-08 PROCEDURE — 94010 BREATHING CAPACITY TEST: CPT | Performed by: FAMILY MEDICINE

## 2020-01-08 PROCEDURE — 99207 C FOOT EXAM  NO CHARGE: CPT | Mod: 25 | Performed by: FAMILY MEDICINE

## 2020-01-08 ASSESSMENT — PAIN SCALES - GENERAL: PAINLEVEL: NO PAIN (0)

## 2020-01-08 ASSESSMENT — MIFFLIN-ST. JEOR: SCORE: 1313.09

## 2020-01-08 NOTE — PROGRESS NOTES
SUBJECTIVE:   CC: Monica Mcgee is an 44 year old woman who presents for preventive health visit.     Healthy Habits:    Do you get at least three servings of calcium containing foods daily (dairy, green leafy vegetables, etc.)? no, taking calcium and/or vitamin D supplement: yes     Amount of exercise or daily activities, outside of work: 2 day(s) per week    Problems taking medications regularly No    Medication side effects: No    Have you had an eye exam in the past two years? no    Do you see a dentist twice per year? yes    Do you have sleep apnea, excessive snoring or daytime drowsiness?no      PROBLEMS TO ADD ON...  Derm- rash on forehead- bumps have been there for about 3 weeks. Non- pruritic. Has not changed any cosmetic products.     Would like to discuss testing for asthma/allergies- has had a persistent cough but feels it's getting much better and is now less frequent. No shortness of breath or wheezing.       HEALTH CARE MAINTENANCE: Due for a mammogram. Recently completed labs in 11/2019    Patient informed that anything we discuss that is not related to preventative medicine, may be billed for; patient verbalizes understanding.    -------------------------------------    Today's PHQ-2 Score:   PHQ-2 ( 1999 Pfizer) 1/8/2020 7/2/2015   Q1: Little interest or pleasure in doing things 0 0   Q2: Feeling down, depressed or hopeless 0 0   PHQ-2 Score 0 0       Abuse: Current or Past(Physical, Sexual or Emotional)- No  Do you feel safe in your environment? Yes        Social History     Tobacco Use     Smoking status: Never Smoker     Smokeless tobacco: Never Used   Substance Use Topics     Alcohol use: No     If you drink alcohol do you typically have >3 drinks per day or >7 drinks per week? No                     Reviewed orders with patient.  Reviewed health maintenance and updated orders accordingly - Yes  Lab work is in process  Labs reviewed in EPIC  BP Readings from Last 3 Encounters:   01/08/20  127/84   19 131/84   10/14/19 131/84    Wt Readings from Last 3 Encounters:   20 70 kg (154 lb 6.4 oz)   19 69.9 kg (154 lb 3.2 oz)   10/14/19 69.4 kg (153 lb)                  Patient Active Problem List   Diagnosis     Hyperlipidemia LDL goal <100     Dysthymic disorder     Bariatric surgery status     External hemorrhoids with complication     Obesity (BMI 30-39.9)     Postsurgical malabsorption     Past Surgical History:   Procedure Laterality Date      SECTION  , 2001    x 2     LAPAROSCOPIC BYPASS GASTRIC N/A 3/9/2016    Procedure: LAPAROSCOPIC BYPASS GASTRIC;  Surgeon: Antoine Alvarez MD;  Location:  OR     LAPAROSCOPIC HERNIORRHAPHY HIATAL N/A 3/9/2016    Procedure: LAPAROSCOPIC HERNIORRHAPHY HIATAL;  Surgeon: Antoine Alvarez MD;  Location:  OR       Social History     Tobacco Use     Smoking status: Never Smoker     Smokeless tobacco: Never Used   Substance Use Topics     Alcohol use: No     Family History   Problem Relation Age of Onset     Diabetes Father      Obesity Father      Diabetes Mother      Hypertension Mother      Hyperlipidemia Mother      Depression Mother      Anxiety Disorder Mother      Obesity Mother      Breast Cancer Mother 42        Double mastectomy     Anesthesia Reaction No family hx of          Current Outpatient Medications   Medication Sig Dispense Refill     Calcium-Phosphorus-Vitamin D (CITRACAL +D3 PO) Take 600 mg by mouth 2 times daily        Cholecalciferol (VITAMIN D3 PO) Take 1,000 Units by mouth 2 times daily        cyanocobalamin (CYANOCOBALAMIN) 1000 MCG/ML injection INJECT 1 ML SUBCUTANEOUSLY EVERY 30 DAYS 3 mL 1     levonorgestrel (MIRENA) 20 MCG/24HR IUD 1 each (20 mcg) by Intrauterine route once for 1 dose 1 each 0     multivitamin  peds with iron (FLINTSTONES COMPLETE) 60 MG chewable tablet Take 2 chew tab by mouth daily (with lunch)        Omega-3 Fatty Acids (FISH OIL PO)        syringe/needle, disp, (BD INTEGRA  "SYRINGE) 25G X 1\" 3 ML MISC use for b-12 injection 3 each 1     No Known Allergies  Recent Labs   Lab Test 11/06/19  0936 09/15/17  0856 06/09/16  1255  12/17/15  1102  05/27/15   A1C 4.9 4.9 5.0   < > 7.9*  --  6.1*   LDL 98 63  --   --   --   --  117   HDL 79 65  --   --   --   --  40   TRIG 55 76  --   --   --   --  145   ALT 36  --   --   --  28  --   --    CR 0.70 0.72  --    < > 0.82  --  0.68   GFRESTIMATED >90 89  --    < > 78  --   --    GFRESTBLACK >90 >90  --    < > >90   GFR Calc    --   --    POTASSIUM 4.2 4.3  --    < > 3.8  --  4.1   TSH 0.84 2.20  --   --   --    < >  --     < > = values in this interval not displayed.        Mammogram not appropriate for this patient based on age.    Pertinent mammograms are reviewed under the imaging tab.  History of abnormal Pap smear: NO - age 30- 65 PAP every 3 years recommended  PAP / HPV Latest Ref Rng & Units 9/14/2017   PAP - NIL   HPV 16 DNA NEG:Negative Negative   HPV 18 DNA NEG:Negative Negative   OTHER HR HPV NEG:Negative Negative     Reviewed and updated as needed this visit by clinical staff  Tobacco  Allergies  Meds  Med Hx  Surg Hx  Fam Hx  Soc Hx        Reviewed and updated as needed this visit by Provider  Tobacco  Surg Hx  Fam Hx  Soc Hx           ROS:  CONSTITUTIONAL: NEGATIVE for fever, chills, change in weight  INTEGUMENTARU/SKIN: NEGATIVE for worrisome rashes, moles or lesions  EYES: NEGATIVE for vision changes or irritation  ENT: NEGATIVE for ear, mouth and throat problems  RESP: NEGATIVE for significant cough or SOB  BREAST: NEGATIVE for masses, tenderness or discharge  CV: NEGATIVE for chest pain, palpitations or peripheral edema  GI: NEGATIVE for nausea, abdominal pain, heartburn, or change in bowel habits  : NEGATIVE for unusual urinary or vaginal symptoms. Periods are regular.  MUSCULOSKELETAL: NEGATIVE for significant arthralgias or myalgia  NEURO: NEGATIVE for weakness, dizziness or paresthesias  PSYCHIATRIC: " "NEGATIVE for changes in mood or affect    OBJECTIVE:   /84   Pulse 76   Temp 98  F (36.7  C) (Tympanic)   Resp 16   Ht 1.582 m (5' 2.28\")   Wt 70 kg (154 lb 6.4 oz)   LMP 01/06/2020   SpO2 98%   BMI 27.98 kg/m    EXAM:  GENERAL: healthy, alert and no distress  EYES: Eyes grossly normal to inspection, PERRL and conjunctivae and sclerae normal  HENT: ear canals and TM's normal, nose and mouth without ulcers or lesions  NECK: no adenopathy, no asymmetry, masses, or scars and thyroid normal to palpation  RESP: lungs clear to auscultation - no rales, rhonchi or wheezes  BREAST: normal without masses, tenderness or nipple discharge and no palpable axillary masses or adenopathy  CV: regular rate and rhythm, normal S1 S2, no S3 or S4, no murmur, click or rub, no peripheral edema and peripheral pulses strong  ABDOMEN: soft, nontender, no hepatosplenomegaly, no masses and bowel sounds normal  MS: no gross musculoskeletal defects noted, no edema  SKIN: macular papular rash on the forehead.   NEURO: Normal strength and tone, mentation intact and speech normal  PSYCH: mentation appears normal, affect normal/bright  Diabetic foot exam: normal DP and PT pulses, no trophic changes or ulcerative lesions, normal sensory exam and normal monofilament exam    Diagnostic Test Results:  Recent Labs reviewed in Epic  Component      Latest Ref Rng & Units 11/6/2019   Sodium      133 - 144 mmol/L 139   Potassium      3.4 - 5.3 mmol/L 4.2   Chloride      94 - 109 mmol/L 106   Carbon Dioxide      20 - 32 mmol/L 28   Anion Gap      3 - 14 mmol/L 5   Glucose      70 - 99 mg/dL 95   Urea Nitrogen      7 - 30 mg/dL 17   Creatinine      0.52 - 1.04 mg/dL 0.70   GFR Estimate      >60 mL/min/1.73:m2 >90   GFR Estimate If Black      >60 mL/min/1.73:m2 >90   Calcium      8.5 - 10.1 mg/dL 9.1   Bilirubin Total      0.2 - 1.3 mg/dL 0.5   Albumin      3.4 - 5.0 g/dL 3.8   Protein Total      6.8 - 8.8 g/dL 7.1   Alkaline Phosphatase      40 - " 150 U/L 39 (L)   ALT      0 - 50 U/L 36   AST      0 - 45 U/L 22   WBC      4.0 - 11.0 10e9/L 4.5   RBC Count      3.8 - 5.2 10e12/L 4.34   Hemoglobin      11.7 - 15.7 g/dL 14.4   Hematocrit      35.0 - 47.0 % 42.3   MCV      78 - 100 fl 98   MCH      26.5 - 33.0 pg 33.2 (H)   MCHC      31.5 - 36.5 g/dL 34.0   RDW      10.0 - 15.0 % 12.2   Platelet Count      150 - 450 10e9/L 249   Cholesterol      <200 mg/dL 188   Triglycerides      <150 mg/dL 55   HDL Cholesterol      >49 mg/dL 79   LDL Cholesterol Calculated      <100 mg/dL 98   Non HDL Cholesterol      <130 mg/dL 109   Iron      35 - 180 ug/dL 126   Iron Binding Cap      240 - 430 ug/dL 295   Iron Saturation Index      15 - 46 % 43   Vitamin D Deficiency screening      20 - 75 ug/L 58   Parathyroid Hormone Intact      18 - 80 pg/mL 28   Ferritin      12 - 150 ng/mL 99   TSH      0.40 - 4.00 mU/L 0.84   Hemoglobin A1C      0 - 5.6 % 4.9     Spirometry results discussed with patient- normal.     ASSESSMENT/PLAN:   Monica was seen today for physical.    Diagnoses and all orders for this visit:    Routine general medical examination at a health care facility    Encounter for screening mammogram for breast cancer  -     MA Screening Digital Bilateral; Future    Persistent cough for 3 weeks or longer;   -     Spirometry, Breathing Capacity  -     [place] Resp Allergen Panel  -     Allergen egg white IgE  -     Allergen sesame seed IgE  -     Allergen peanut IgE  -     Allergen soybean IgE  -     Allergen hazelnut IgE  -     Allergen milk IgE  -     Allergen almonds IgE  -     Allergen cashew IgE  -     Allergen shrimp IgE  -     Allergen walnuts IgE  -     Allergen codfish IgE  -     Allergen scallop IgE  -     Allergen wheat IgE  -     Allergen tuna IgE        -     Allergen salmon IgE    Rash and nonspecific skin eruption  Etiology unclear, will await allergy panel results.  If negative, will refer to Dermatology    Controlled type 2 diabetes mellitus without  "complication, without long-term current use of insulin (H); resolved after gastric bypass  -     FOOT EXAM  -     Ophthalmology referral    Bariatric surgery status        -   Recent annal post gastric bypass labs were normal.         -   Doing well.         COUNSELING:   Reviewed preventive health counseling, as reflected in patient instructions       Regular exercise       Healthy diet/nutrition    Estimated body mass index is 27.98 kg/m  as calculated from the following:    Height as of this encounter: 1.582 m (5' 2.28\").    Weight as of this encounter: 70 kg (154 lb 6.4 oz).    Weight management plan: Discussed healthy diet and exercise guidelines     reports that she has never smoked. She has never used smokeless tobacco.      Counseling Resources:  ATP IV Guidelines  Pooled Cohorts Equation Calculator  Breast Cancer Risk Calculator  FRAX Risk Assessment  ICSI Preventive Guidelines  Dietary Guidelines for Americans, 2010  USDA's MyPlate  ASA Prophylaxis  Lung CA Screening    Follow up annually and as needed thoughout the year.    Shaila Flores MD  [facility]  "

## 2020-01-13 ENCOUNTER — ANCILLARY PROCEDURE (OUTPATIENT)
Dept: MAMMOGRAPHY | Facility: CLINIC | Age: 45
End: 2020-01-13
Attending: FAMILY MEDICINE
Payer: COMMERCIAL

## 2020-01-13 DIAGNOSIS — Z12.31 ENCOUNTER FOR SCREENING MAMMOGRAM FOR BREAST CANCER: ICD-10-CM

## 2020-01-13 LAB
A ALTERNATA IGE QN: <0.1 KU(A)/L
A FUMIGATUS IGE QN: <0.1 KU(A)/L
ALMOND IGE QN: <0.1 KU(A)/L
BERMUDA GRASS IGE QN: <0.1 KU(A)/L
C HERBARUM IGE QN: <0.1 KU(A)/L
CASHEW NUT IGE QN: <0.1 KU(A)/L
CAT DANDER IGG QN: <0.1 KU(A)/L
CEDAR IGE QN: <0.1 KU(A)/L
COMMON RAGWEED IGE QN: <0.1 KU(A)/L
COTTONWOOD IGE QN: <0.1 KU(A)/L
COW MILK IGE QN: <0.1 KU(A)/L
D FARINAE IGE QN: <0.1 KU(A)/L
D PTERONYSS IGE QN: <0.1 KU(A)/L
DOG DANDER+EPITH IGE QN: <0.1 KU(A)/L
EGG WHITE IGE QN: <0.1 KU(A)/L
HAZELNUT IGE QN: <0.1 KU(A)/L
IGE SERPL-ACNC: 7 KIU/L (ref 0–114)
MAPLE IGE QN: <0.1 KU(A)/L
MARSH ELDER IGE QN: <0.1 KU(A)/L
MOUSE URINE PROT IGE QN: <0.1 KU(A)/L
NETTLE IGE QN: <0.1 KU(A)/L
P NOTATUM IGE QN: <0.1 KU(A)/L
PEANUT IGE QN: <0.1 KU(A)/L
ROACH IGE QN: <0.1 KU(A)/L
SALMON IGE QN: <0.1 KU(A)/L
SALTWORT IGE QN: <0.1 KU(A)/L
SCALLOP IGE QN: <0.1 KU(A)/L
SESAME SEED IGE QN: <0.1 KU(A)/L
SHRIMP IGE QN: <0.1 KU(A)/L
SILVER BIRCH IGE QN: <0.1 KU(A)/L
SOYBEAN IGE QN: <0.1 KU(A)/L
TIMOTHY IGE QN: <0.1 KU(A)/L
TUNA IGE QN: <0.1 KU(A)/L
WALNUT IGE QN: <0.1 KU(A)/L
WHEAT IGE QN: <0.1 KU(A)/L
WHITE ASH IGE QN: <0.1 KU(A)/L
WHITE ELM IGE QN: <0.1 KU(A)/L
WHITE MULBERRY IGE QN: <0.1 KU(A)/L
WHITE OAK IGE QN: <0.1 KU(A)/L

## 2020-01-13 PROCEDURE — 77067 SCR MAMMO BI INCL CAD: CPT | Mod: TC

## 2020-01-15 LAB — CODFISH IGE QN: <0.1 KU(A)/L

## 2020-01-31 ENCOUNTER — TELEPHONE (OUTPATIENT)
Dept: DERMATOLOGY | Facility: CLINIC | Age: 45
End: 2020-01-31

## 2020-01-31 NOTE — TELEPHONE ENCOUNTER
[facility] Call Center    Phone Message    May a detailed message be left on voicemail: yes    Reason for Call: Patient called to schedule for a rash on her face per her referral and the writer reached out to the care team and there was no one available to assist in scheduling. Please advise. Thank you.    Action Taken: Message routed to:  Adult Clinics: Dermatology p [phone]

## 2020-01-31 NOTE — TELEPHONE ENCOUNTER
Left message for patient to call [facility] in [place] back at [phone]    Writer tentatively scheduled patient for 8:15 on 2-7-2020 with Dr. Fraga for she had an opening. Informed on message of this and asked that patient call back to confirm if this appointment works or if we need to look for other options.     Radha Gonzalez LPN

## 2020-02-04 NOTE — TELEPHONE ENCOUNTER
Called patient.  She stated that day and time works for her and she will be here.    Peyton Pedro, CMA

## 2020-02-07 ENCOUNTER — OFFICE VISIT (OUTPATIENT)
Dept: DERMATOLOGY | Facility: CLINIC | Age: 45
End: 2020-02-07
Payer: COMMERCIAL

## 2020-02-07 DIAGNOSIS — L70.0 ACNE VULGARIS: Primary | ICD-10-CM

## 2020-02-07 PROCEDURE — 99202 OFFICE O/P NEW SF 15 MIN: CPT | Performed by: DERMATOLOGY

## 2020-02-07 RX ORDER — TRETINOIN 0.25 MG/G
CREAM TOPICAL
Qty: 45 G | Refills: 0 | Status: SHIPPED | OUTPATIENT
Start: 2020-02-07 | End: 2020-10-27

## 2020-02-07 RX ORDER — AZELAIC ACID 0.15 G/G
GEL TOPICAL
Qty: 50 G | Refills: 3 | Status: SHIPPED | OUTPATIENT
Start: 2020-02-07 | End: 2020-10-27

## 2020-02-07 NOTE — NURSING NOTE
Monica Mcgee's goals for this visit include:   Chief Complaint   Patient presents with     Derm Problem     bumps on face       She requests these members of her care team be copied on today's visit information: yes    PCP: Shaila Flores    Referring Provider:  No referring provider defined for this encounter.    There were no vitals taken for this visit.    Do you need any medication refills at today's visit? No     Amorrdavid Weeks CMA

## 2020-02-07 NOTE — PATIENT INSTRUCTIONS
Topical Retinoids    What are topical retinoids?    These are medicines that are related to Vitamin A. They are used on the skin.    Retin-A , Renova , Differin , and Tazorac  are brand names.    Come in creams and clear gels    Used to treat skin conditions like pimples (acne), face wrinkling, or dark-colored sunspots    How do I use these medicines?    Wash face and let dry for 15 to 30 minutes.    Use a large pea-size amount of medicine to cover the whole face. Do not put on close to the eyes and lips. Rub in gently.     Start by using every other day. If you have no irritation after a few days, start to use it daily.     You might have too much irritation with daily use. Use it less often until the irritation goes away. Then try to increase slowly to daily use.     Irritation improves over time.    You may use moisturizer if your skin becomes dry. Look for  non-comedogenic  (non-pore plugging) and oil free products.     What are the side effects?    Dryness     Peeling and flaking     Irritation of the skin     Possible increased chance of sunburns. Protect your skin from sunlight. Wear a hat and use a sunscreen with SPF 30 or higher. Your sunscreen should have both UVA and UVB (broad-spectrum) protection.    Who should I call with questions?    [facility]: [phone]     [facility]: [phone]    For urgent needs outside of business hours call the [facility] at [phone] and ask for the dermatology resident on call    Chemical Peel Preparation    Pre-procedure:    Reveal all medical or skin conditions, especially cold sores, use of prescriptions medications, recent peels or other facial procedure, etc. to help determine procedure plan    Arrive with a fully cleansed face, free of makeup    It is recommended that men not shave within a few hours prior to the treatment    Plan for photos to track progress and in case of side  effects    Stop ALL of the following 7 days pre-procedure:    Topical vitamin A products such as retinols or retinoids (Differin, adapalene, Retin-A, tretinoin)    Products with glycolic acid, salicylic acid, Vitamin A, hydroquinone    Depilatory use/waxing    Chemical peel within the 8 weeks depending on peel depth    Excessively drying or irritating products    Electrolysis    Hair chemicals    2 weeks pre-procedure:    Cannot have Botox of fillers    4-6 weeks pre-procedure:    Free of cystic acne or herpes outbreak    If using Efudex treatment for pre-cancer, treatment needs to be completed    Who should I call with questions?    [facility]: [phone]    [facility]: [phone]    For urgent needs outside of business hours call the [facility] at [phone] and ask for the dermatology [id number]

## 2020-02-07 NOTE — LETTER
2020         RE: Monica Mcgee  [address] [place] [id number]        Dear Colleague,    Thank you for referring your patient, Monica Mcgee, to the [facility]. Please see a copy of my visit note below.    [facility] Dermatology Note      Dermatology Problem List:  1.Acne vulgaris  -tretinoin 0.025%, 20% sal acid peels    Encounter Date: 2020    CC:  Chief Complaint   Patient presents with     Derm Problem     bumps on face         History of Present Illness:  Ms. Monica Mcgee is a 44 year old female who presents as a referral from her PCP for a rash. She has not been seen by out dermatology clinic before. Today she reports that the rash presents as bumps on her forehead that have been present for 1-2 months. No product changes. She has been using CerVe wash and applying make-up to the area 2-3 days per week. No retinoids. She does usually get acne in the area. She has some hair thinning, but believes it is related to her bariatric surgery. No acne on chest or back.    No other concerns.      Past Medical History:   Patient Active Problem List   Diagnosis     Hyperlipidemia LDL goal <100     Dysthymic disorder     Bariatric surgery status     External hemorrhoids with complication     Obesity (BMI 30-39.9)     Postsurgical malabsorption     Past Medical History:   Diagnosis Date     Family history of breast cancer in mother      Hypertension     resolved with gastric bypass     IUD (intrauterine device) in place     Kyleena placed in 3/2018     Morbid obesity (H)     in the process of undergoing bariatric surgery     Type 2 diabetes mellitus (H)     resolved after Gastric bypass     Past Surgical History:   Procedure Laterality Date      SECTION  , 2001    x 2     LAPAROSCOPIC BYPASS GASTRIC N/A 3/9/2016    Procedure: LAPAROSCOPIC BYPASS GASTRIC;  Surgeon: Antoine Alvarez MD;  Location:  OR      "LAPAROSCOPIC HERNIORRHAPHY HIATAL N/A 3/9/2016    Procedure: LAPAROSCOPIC HERNIORRHAPHY HIATAL;  Surgeon: Antoine Alvarez MD;  Location: [facility] OR       Social History:  Patient reports that she has never smoked. She has never used smokeless tobacco. She reports that she does not drink alcohol or use drugs.    Family History:  Family History   Problem Relation Age of Onset     Diabetes Father      Obesity Father      Diabetes Mother      Hypertension Mother      Hyperlipidemia Mother      Depression Mother      Anxiety Disorder Mother      Obesity Mother      Breast Cancer Mother 42        Double mastectomy     Anesthesia Reaction No family hx of        Medications:  Current Outpatient Medications   Medication Sig Dispense Refill     Calcium-Phosphorus-Vitamin D (CITRACAL +D3 PO) Take 600 mg by mouth 2 times daily        Cholecalciferol (VITAMIN D3 PO) Take 1,000 Units by mouth 2 times daily        cyanocobalamin (CYANOCOBALAMIN) 1000 MCG/ML injection INJECT 1 ML SUBCUTANEOUSLY EVERY 30 DAYS 3 mL 1     levonorgestrel (MIRENA) 20 MCG/24HR IUD 1 each (20 mcg) by Intrauterine route once for 1 dose 1 each 0     multivitamin  peds with iron (FLINTSTONES COMPLETE) 60 MG chewable tablet Take 2 chew tab by mouth daily (with lunch)        Omega-3 Fatty Acids (FISH OIL PO)        syringe/needle, disp, (BD INTEGRA SYRINGE) 25G X 1\" 3 ML MISC use for b-12 injection 3 each 1       No Known Allergies    Review of Systems:  -Constitutional: Otherwise feeling well today, in usual state of health.  -OB: not preg or BF  -Skin: As above in HPI. No additional skin concerns.    Physical exam:  Vitals: There were no vitals taken for this visit.  GEN: This is a well developed, well-nourished female in no acute distress, in a pleasant mood.    SKIN: Acne exam, which includes the face, neck, upper central chest, and upper central back was performed.  -Moise Skin Type: II  -Scattered brown macules on sun exposed areas.  -open and " closed comedones forehead  -no enlargement of oil glands  -acneiform papules face and temples (about 5 lesions)  -upper central chest clear  -upper central back clear  -No other lesions of concern on areas examined.       Impression/Plan:  1. Acne vulgaris - forehead (comedones) and bilateral cheeks  -Start tretinoin 0.25%. Apply pea-sized amount to face at night. Start every other day for 2 weeks, then increase to daily as tolerated. Hold for 1 week before chemical peels. Reviewed side effects  -Start Finecea. Apply to face every AM. Hold for 1 week before chemical peels.   - For skin peels on the face: Discussed risk of peels including but not limited to erythema, peeling, redness, blisters, reactivation of HSV, rare risk of scarring, and hypo and hyperpigmentation. Peel handout provided. Patient will likely require 3-6 peels, approximately 4-6 weeks apart for improvement. Patient will be using 20% salicylic acid peel. Patient will discontinue differin/retinoinds/BP/efudex containing products 1 week prior and 1 week after. Moise skin type II. Denies allergies to strawberries/aspirin/sulfa.  Has not been in Accutane in the last 6 months.  Not currently pregnant or breastfeeding. Patient has not had other skin procedures in the last week and was counseled to avoid hair removal procedures including lasers, depilatory cream, waxing and electrolysis the week prior.  Consent obtained in clinic today and pre-peel instructions provided in written format.   -of no improvement consdier 2mm punch of lesion on forehead at follow up  2. Solar lentigenes- tretinoin and chemcial peels as above    Follow-up  In 3 months with MD and with nursing earlier for chemical peels, earlier if needed.       Staff Involved:  Scribe/Staff      Scribe Disclosure  I, Shahzad Hayes, am serving as a scribe to document services personally performed by Dr. Patricia Fraga MD, based on data collection and the provider's statements to  me.    Provider Disclosure:   The documentation recorded by the scribe accurately reflects the services I personally performed and the decisions made by me.    Patricia Fraga MD    Department of Dermatology  [facility]: Phone: [phone], Fax:[phone]  [facility]: Phone: [phone], Fax: [phone]              Again, thank you for allowing me to participate in the care of your patient.        Sincerely,        Patricia Fraga MD

## 2020-02-07 NOTE — PROGRESS NOTES
[facility] Dermatology Note      Dermatology Problem List:  1.Acne vulgaris  -tretinoin 0.025%, 20% sal acid peels    Encounter Date: 2020    CC:  Chief Complaint   Patient presents with     Derm Problem     bumps on face         History of Present Illness:  Ms. Monica Mcgee is a 44 year old female who presents as a referral from her PCP for a rash. She has not been seen by out dermatology clinic before. Today she reports that the rash presents as bumps on her forehead that have been present for 1-2 months. No product changes. She has been using CerVe wash and applying make-up to the area 2-3 days per week. No retinoids. She does usually get acne in the area. She has some hair thinning, but believes it is related to her bariatric surgery. No acne on chest or back.    No other concerns.      Past Medical History:   Patient Active Problem List   Diagnosis     Hyperlipidemia LDL goal <100     Dysthymic disorder     Bariatric surgery status     External hemorrhoids with complication     Obesity (BMI 30-39.9)     Postsurgical malabsorption     Past Medical History:   Diagnosis Date     Family history of breast cancer in mother      Hypertension     resolved with gastric bypass     IUD (intrauterine device) in place     Kyleena placed in 3/2018     Morbid obesity (H)     in the process of undergoing bariatric surgery     Type 2 diabetes mellitus (H)     resolved after Gastric bypass     Past Surgical History:   Procedure Laterality Date      SECTION  , 2001    x 2     LAPAROSCOPIC BYPASS GASTRIC N/A 3/9/2016    Procedure: LAPAROSCOPIC BYPASS GASTRIC;  Surgeon: Antoine Alvarez MD;  Location:  OR     LAPAROSCOPIC HERNIORRHAPHY HIATAL N/A 3/9/2016    Procedure: LAPAROSCOPIC HERNIORRHAPHY HIATAL;  Surgeon: Antoine Alvarez MD;  Location:  OR       Social History:  Patient reports that she has never smoked. She has never used smokeless tobacco. She reports that she  "does not drink alcohol or use drugs.    Family History:  Family History   Problem Relation Age of Onset     Diabetes Father      Obesity Father      Diabetes Mother      Hypertension Mother      Hyperlipidemia Mother      Depression Mother      Anxiety Disorder Mother      Obesity Mother      Breast Cancer Mother 42        Double mastectomy     Anesthesia Reaction No family hx of        Medications:  Current Outpatient Medications   Medication Sig Dispense Refill     Calcium-Phosphorus-Vitamin D (CITRACAL +D3 PO) Take 600 mg by mouth 2 times daily        Cholecalciferol (VITAMIN D3 PO) Take 1,000 Units by mouth 2 times daily        cyanocobalamin (CYANOCOBALAMIN) 1000 MCG/ML injection INJECT 1 ML SUBCUTANEOUSLY EVERY 30 DAYS 3 mL 1     levonorgestrel (MIRENA) 20 MCG/24HR IUD 1 each (20 mcg) by Intrauterine route once for 1 dose 1 each 0     multivitamin  peds with iron (FLINTSTONES COMPLETE) 60 MG chewable tablet Take 2 chew tab by mouth daily (with lunch)        Omega-3 Fatty Acids (FISH OIL PO)        syringe/needle, disp, (BD INTEGRA SYRINGE) 25G X 1\" 3 ML MISC use for b-12 injection 3 each 1       No Known Allergies    Review of Systems:  -Constitutional: Otherwise feeling well today, in usual state of health.  -OB: not preg or BF  -Skin: As above in HPI. No additional skin concerns.    Physical exam:  Vitals: There were no vitals taken for this visit.  GEN: This is a well developed, well-nourished female in no acute distress, in a pleasant mood.    SKIN: Acne exam, which includes the face, neck, upper central chest, and upper central back was performed.  -Moise Skin Type: II  -Scattered brown macules on sun exposed areas.  -open and closed comedones forehead  -no enlargement of oil glands  -acneiform papules face and temples (about 5 lesions)  -upper central chest clear  -upper central back clear  -No other lesions of concern on areas examined.       Impression/Plan:  1. Acne vulgaris - forehead " (comedones) and bilateral cheeks  -Start tretinoin 0.25%. Apply pea-sized amount to face at night. Start every other day for 2 weeks, then increase to daily as tolerated. Hold for 1 week before chemical peels. Reviewed side effects  -Start Finecea. Apply to face every AM. Hold for 1 week before chemical peels.   - For skin peels on the face: Discussed risk of peels including but not limited to erythema, peeling, redness, blisters, reactivation of HSV, rare risk of scarring, and hypo and hyperpigmentation. Peel handout provided. Patient will likely require 3-6 peels, approximately 4-6 weeks apart for improvement. Patient will be using 20% salicylic acid peel. Patient will discontinue differin/retinoinds/BP/efudex containing products 1 week prior and 1 week after. Moise skin type II. Denies allergies to strawberries/aspirin/sulfa.  Has not been in Accutane in the last 6 months.  Not currently pregnant or breastfeeding. Patient has not had other skin procedures in the last week and was counseled to avoid hair removal procedures including lasers, depilatory cream, waxing and electrolysis the week prior.  Consent obtained in clinic today and pre-peel instructions provided in written format.   -of no improvement consdier 2mm punch of lesion on forehead at follow up  2. Solar lentigenes- tretinoin and chemcial peels as above    Follow-up  In 3 months with MD and with nursing earlier for chemical peels, earlier if needed.       Staff Involved:  Scribe/Staff      Scribe Disclosure  I, Shahzad Hayes, am serving as a scribe to document services personally performed by Dr. Patricia Fraga MD, based on data collection and the provider's statements to me.    Provider Disclosure:   The documentation recorded by the scribe accurately reflects the services I personally performed and the decisions made by me.    Patricia Fraga MD    Department of Dermatology  [facility]  [facility]: Phone: [phone], Fax:[phone]  [facility]: Phone: [phone], Fax: [phone]

## 2020-02-24 ENCOUNTER — ALLIED HEALTH/NURSE VISIT (OUTPATIENT)
Dept: NURSING | Facility: CLINIC | Age: 45
End: 2020-02-24

## 2020-02-24 DIAGNOSIS — L70.0 ACNE VULGARIS: Primary | ICD-10-CM

## 2020-02-24 PROCEDURE — 96999 UNLISTED SPEC DERM SVC/PX: CPT | Performed by: DERMATOLOGY

## 2020-02-24 ASSESSMENT — PAIN SCALES - GENERAL: PAINLEVEL: NO PAIN (0)

## 2020-02-24 NOTE — PATIENT INSTRUCTIONS
Skin Peel Post Care Instructions    I will experience redness, swelling, peeling, pain, and heat sensation which can last 5-10days and may persist for 2-3weeks. I may experience itching, or acne. Risks are permanent scarring, temporary or permanent skin lightening or darkening, infection, and eye injury. I understand my outcome could be no improvement or slight improvement. Multiple treatments may be required.     What can I expect?    Skin peeling for three to five days    Flaking    Skin darkening    Redness    How do I take care of my skin?    Patient compliance is mandatory for an optimal outcome and to avoid complications    Wear sunscreen (SPF 30 or greater) and a hat. Stay out of the sun for three to four weeks     Use a gentle skin care regimen with a sunscreen with at least an SPF of 30 or more. Examples include the following:   o SkinCeuticals Gentle Cleanser and SkinCeuticals Physical Matte UV DEFENSE SPF 50   o Cetaphil Soap followed by Cetaphil Sunscreen in the am and pm    Put on a bland moisturizer (Aquaphor or Vaseline) if sunscreen stings    Do not pick at peel or peel the skin. This will cause scarring.    Wait to use medicated creams until the skin has healed. This occurs five to seven days later    You can use make-up after 24 hours. Make sure make-up does NOT contain salicylic acid.     Eye make-up and lipstick are okay immediately after procedure    Do not use facial scrubs, depilatories, steam, any exfoliation procedures, etc. on your face (or treated area of skin) for one week post-peel    When should I call the doctor?    Cold sores    Swelling    Crusting    Who should I call with questions?    [facility]: [phone]     [facility]: [phone]    For urgent needs outside of business hours call the [facility] at [phone] and ask for the dermatology resident on call

## 2020-02-24 NOTE — NURSING NOTE
Monica Mcgee comes into clinic today at the request of  Ordering Provider for Chemical peels .    Nursing Peel Treatment Record:  Feb 24, 2020    Pre peel:    Any changes in health or medications: No    Strawberry or aspirin allergy (If yes, then no salicylic acid peels to be given and procedure to be held): No    Sulfa allergy (If yes, then SkinCeuticals sensitive skin peel procedure to be held): No    Is the patient taking Valtrex: no - .    Pregnant or breastfeeding (If yes, peel procedure to be held): No    Baseline photo obtained (3 views): Yes    Areas treated today: face    Treatment #: 1.    Confirmed that retinoid was stopped 7 days prior to peel: Yes    Peel Type: Micropeel plus(20% salicylic acid with 3% glycolic acid)    Confirmed that patient has not had electrolysis, depilatory creams, waxing or laser hair removal in the last week: Yes    Peel record:    Eye shields were placed.    Area to be treated was cleansed with Simply Clean Cleanser using rough 4X4 gauze pads.    Dermaplaning was performed: no.    Area was then toned with with the Conditioning Solution using rough 4X4 gauze pads.    Then, the areas were degreased with acetone. Time-out was performed to evaluate for any sensitive skin.      Hydrabalm was then applied to the lips, perinasal region and near the outer canthi.     The peel was applied with brand swabs for 2 passes with the exception of the right cheek area which only 1 pass was done. 3 passes only done to left cheek and lower chin area. The peel was  then removed with water dampened 4X4 soft gauze.    CE Ferulic, Phytocorrective gel and epidermal repair topicals were applied and followed by Sunscreen (Sheer Physical UV defence SPF 50).         Post peel:    Patient reminded of need to wait to use medicated creams until the skin has healed. This occurs five to seven days later. Post peel care instructions provided including need for sun avoidance. Patient tolerated peel  well, no complications noted.     Payment:  The patient paid $325.00 for this peel.     Pt will call back to schedule another peel appt     This service provided today was under the supervising provider of the day , who was available if needed.    Ronda Dunn RN

## 2020-02-28 ENCOUNTER — TELEPHONE (OUTPATIENT)
Dept: SURGERY | Facility: CLINIC | Age: 45
End: 2020-02-28

## 2020-02-28 NOTE — TELEPHONE ENCOUNTER
Pt has 5 yr f/u Thu, 3/5, wants labs at [facility] please.    [phone]  **OK to leave detailed VM**

## 2020-03-05 ENCOUNTER — OFFICE VISIT (OUTPATIENT)
Dept: SURGERY | Facility: CLINIC | Age: 45
End: 2020-03-05
Payer: COMMERCIAL

## 2020-03-05 VITALS
DIASTOLIC BLOOD PRESSURE: 83 MMHG | SYSTOLIC BLOOD PRESSURE: 120 MMHG | WEIGHT: 157.8 LBS | HEIGHT: 62 IN | OXYGEN SATURATION: 100 % | HEART RATE: 63 BPM | BODY MASS INDEX: 29.04 KG/M2

## 2020-03-05 VITALS — HEIGHT: 62 IN | WEIGHT: 152.8 LBS | BODY MASS INDEX: 28.12 KG/M2

## 2020-03-05 DIAGNOSIS — Z98.84 WEIGHT GAIN STATUS POST GASTRIC BYPASS: ICD-10-CM

## 2020-03-05 DIAGNOSIS — K91.2 POSTSURGICAL MALABSORPTION: ICD-10-CM

## 2020-03-05 DIAGNOSIS — R63.5 WEIGHT GAIN STATUS POST GASTRIC BYPASS: ICD-10-CM

## 2020-03-05 DIAGNOSIS — Z98.84 BARIATRIC SURGERY STATUS: ICD-10-CM

## 2020-03-05 DIAGNOSIS — Z98.84 BARIATRIC SURGERY STATUS: Primary | ICD-10-CM

## 2020-03-05 PROCEDURE — 97803 MED NUTRITION INDIV SUBSEQ: CPT | Performed by: DIETITIAN, REGISTERED

## 2020-03-05 PROCEDURE — 99215 OFFICE O/P EST HI 40 MIN: CPT | Performed by: PHYSICIAN ASSISTANT

## 2020-03-05 RX ORDER — MULTIVITAMIN WITH IRON
1 TABLET ORAL DAILY
COMMUNITY

## 2020-03-05 RX ORDER — PHENTERMINE HYDROCHLORIDE 37.5 MG/1
37.5 TABLET ORAL
Qty: 90 TABLET | Refills: 0 | Status: SHIPPED | OUTPATIENT
Start: 2020-03-05 | End: 2020-10-27

## 2020-03-05 ASSESSMENT — MIFFLIN-ST. JEOR
SCORE: 1305.32
SCORE: 1328

## 2020-03-05 NOTE — PROGRESS NOTES
BARIATRIC FOLLOW UP VISIT     March 5, 2020       HISTORY OF PRESENT ILLNESS: Pt returns today for her follow-up appointment status post laparoscopic gastric bypassOverall she is doing well.  Is up 12 lbs from last year and it is really weighing on her mind. She got  in October in [facility].  Was working out 3 times weekly leading up to the wedding but stopped right afterwards. Figured out that she was having a [employer] drink most days of the week for the past year that has 800 calories.  When she found this out last month she stopped drinking it.  Is now having a regular cup of coffee with some creamer.  No other concerns    Initial Weight (lbs): 237.4 lbs   Current Weight: 157 lb 12.8 oz (71.6 kg)  Cumulative weight loss (lbs): 79.6  Last Visits Weight: 145 lb 3.2 oz (65.9 kg)     Patient is taking the following bariatric postoperative vitamins:  2 Complete multivitamins with minerals (at different times than calcium)   2000 International Units of Vitamin D daily  1200 mg of Calcium daily in divided doses  Vitamin B12 injection - last one was 5 weeks ago. She is not consistent with taking  1 B Complex  No iron per protocol    Pt is not exercising for 4 months.  Stopped after getting .  Does have a lifetime membership       OBESITY RELATED CONDITIONS:  Diabetes - Resolved.  Last hemoglobin A1C = 4.9  High blood pressure - resolved  High cholesterol - resolved       SOCIAL HISTORY:  Pt denies smoking.  Pt drinks 1 moscato twice monthly.  Avoids NSAIDS.  Still having small coffee in the am.        REVIEW OF SYSTEMS:   GI:  Nausea- No  Vomiting- No  Diarrhea- No  Constipation- Takes laxative daily.  Drinks 48 oz water  Dysphagia- No  Abdominal Pain- No  Heartburn- No     SKIN:  Intertriginous irritation- No      General  Fatigue: yes  Sleep Quality:good  HEENT  Hx of glaucoma: No  Vision changes: No  Cardiovascular  Chest Pain with Exertion: No  Palpitations: No  Hx of heart disease:  "No  Pulmonary  Shortness of breath at rest: No  Shortness of breath with exertion: No  Snoring: some  Gastrourinary  History of kidney stones: No  Psychiatric  Moods Stable: Yes  Anxiety: No  Depression : No  History of drug abuse: No  Endocrine  Polydipsia: No  Polyuria: No  Neurologic:  Hx of seizures: No  Hx of paresthesias No    Birth Control: Mirena      LABS/IMAGING/MEDICAL RECORDS REVIEW: Last set of labs    PHYSICAL EXAMINATION:   /83   Pulse 63   Ht 5' 2.25\" (1.581 m)   Wt 157 lb 12.8 oz (71.6 kg)   SpO2 100%   BMI 28.63 kg/m      GENERAL: Alert and oriented x3. NAD  HEART: No murmurs, rubs or gallops, Regular rate and rhythm  LUNGS: Breathing unlabored, Lung sounds clear to auscultation bilaterally  ABDOMEN: soft; nontender; nondistended, incision well healed. No hernia  EXTREMITIES: No LE edema bilaterally, Gait normal  SKIN: No intertriginous irritation or rash      ASSESSMENT AND PLAN:      4 years status laparoscopic gastric bypass  Morbid Obesity - Resolved   Body mass index is 28.63 kg/m .    Weight regain - discussed starting a medication to aid in weight loss.  Reviewed EKG from 2015 and it was WNL  She denies any cardiac history.    We discussed the role of pharmacological agents in the treatment of obesity and the \"off-label\" use of medications in this practice. We discussed the risks and benefits of each. We discussed indications, contraindications, potential side effects, and estimated costs of each. Discussed that medications must always be used together with lifestyle changes such as improvements in diet choices, portion control and establishing and maintaining a regular exercise program.      Patient would be a candidate for either topamax or phentermine.  She would like to start phentermine.  Has taken in the past. Side effects discussed with patient and patient education provided.      Patient agrees to have blood pressure and pulse checked 7-10 days after starting.  If BP is " greater than 140/90 or pulse greater than 100 will contact clinic.    All of patients questions about the weight loss program were answered fully.      Post surgical malabsorption:   Ordered CBC, vitamin B12, vitamin D, PTH, ferritin, TIBC, and iron labs.   Follow food plan per dietitian recommendations.   Continue taking recommended post-op vitamins. - Patient is planning to switch to sublingual Vitamin B12  Goals are to increase water to 60 oz daily. This should help with her constipation  Constipation - continue current regime  Decrease caffeine by switching to half and half  Increase walking   Return to clinic in 2 months for medication check. Will see diet at the same time.       I spent a total of 40 minutes face to face with Monica during today's office visit. Over 50% of this time was spent counseling the patient and/or coordinating care.

## 2020-03-05 NOTE — PROGRESS NOTES
"NUTRITION POST OP APPOINTMENT  DATE OF VISIT: March 5, 2020    Monica Mcgee  1975  female  [id number]  44 year old     ASSESSMENT:    REASON FOR VISIT:  Monica is a 44 year old year old female presents today for 4 year PO nutrition follow-up appointment. Patient is accompanied by self.    DIAGNOSIS:  Status post gastric bypass surgery.  Obesity Overweight BMI 25-29.9     ANTHROPOMETRICS:  Initial Weight: 237 lbs   Height: 158.1 cm (5' 2.25\")  Current Weight: 69.3 kg (152 lb 12.8 oz)   BMI: 27.72 kg/(m^2).    VITAMINS AND MINERALS:  2 Multivitamin with Minerals (lunch)  600 mg Calcium With Vitamin D BID (7am and 9pm)  2000 International units Vitamin D  1000 mcg Vitamin B-12 injection - inconsistent  1 Vitamin B Complex  No iron needed per clinic guidelines    NUTRITION HISTORY:  Breakfast: (skips ~50% of the time) [7am] sausage, egg and cheese biscuit (Epstein's, 1/2 the biscuit)   Lunch: [12-1pm] leftovers  Supper: [6:30pm] seafood (shrimp or scallops) +/- risotto  salad (pre-made pack)  cauliflower crust pizza  Snacks: [evenings 2-3x/week] 1 piece of chocolate   Fluids consumed: Water (48oz), coffee (8oz, caffeinated, with creamer), EtOH (1 moscato every other week)   Consuming liquid calories: Yes  Protein intake: 50-60 grams/day  Tolerate regular texture food: Yes  Any foods not tolerated details: Yes  If any food not tolerated: no intolerances but bread, pasta, potatoes, biscuits, etc sit heavy  Portion size: 1 cup or more  Take 20-30 minutes to consume each meal: Yes   Eat protein foods first: No  Fluids and meals separate by at least 30 minutes: No  Chew foods thoroughly: Yes  Tolerating diet: Yes  Drinking high protein supplements: No  Consuming snacks per day: rare  Additional Information: Pt got  in 10/2019 and has since stopped exercising. Not snacking but several months of consuming 800kcal specialty coffee drinks ~5x/week. Has stopped drinking these since becoming aware of kcal level " in January. Reviewed portion guidelines and recommended milk or protein drinks between meals. Pt to discuss weight loss meds with PA-C today.      PHYSICAL ACTIVITY:  None    DIAGNOSIS:  Previous Nutrition Diagnosis: Altered gastrointestinal function related to alteration in gastrointestinal structure as evidenced by history of gastric bypass surgery.- no change    Previous goals:  Consume 60-90 g protein per day - not met  Replace sancks with 1 half-1 protein drink per day (does not cre for milk) - not met  Replace specialty coffee drink with a lower calorie decaf. coffee or herbal or decaf tea - not met  Restart exercise 3 X per week (get strength training in 2 X per week) - not met    Current Nutrition Diagnosis: Altered gastrointestinal function related to alteration in gastrointestinal structure as evidenced by history of gastric bypass surgery.    INTERVENTION:   Nutrition Prescription: Eat 3 meals a day at regular intervals. Consume 60-90 grams of protein daily. Follow post-surgical vitamin and mineral protocol.  Assessed learning needs and learning preferences.    GOALS:  Begin taking Vitamin B12 per guidelines (written and reviewed)  Aim for 1c portions; measure occasionally to stay on track  Have 1-2c of milk each day or up to 1 protein drink  Have a protein drink for breakfast rather than skipping meal     Follow-Up:   Recommend follow up visit in 2 months to assist with lifestyle changes or per insurance.  Implementation: Discussed progress toward previous goals; reinforced importance of following bariatric lifestyle changes.    NUTRITION MONITORING AND EVALUATION:  Anticipated compliance: fair-good  Verbalized good understanding.    Follow up: Patient to follow up in 2 months.    TIME SPENT WITH PATIENT:  30 minutes    Shirley Dempsey RD, LD  Clinical Dietitian

## 2020-03-05 NOTE — PATIENT INSTRUCTIONS
"Patient agrees to have blood pressure and pulse checked 7-10 days after starting.  If BP is greater than 140/90 or pulse greater than 100 will contact clinic      Information about the Weight Loss Medication Phentermine      Phentermine is a stimulant medication related to the amphetamine class of medication but with a lower risk of dependence and addiction.  It is used for weight loss by suppressing the appetite region of the brain.  It also may speed up the metabolic rate and give a person more energy.  Like any medication there are potential side effects and the most common are:  Dry mouth occurs in almost everyone (hydrate well), fewer people experience Palpatations, fast heart rate, elevation of blood pressure, restlessness, insomnia, dizziness, change in mood, tremor, headache, changes in bowel movements,itchiness, changes in sex drive.    Some people can develop serious side effects which include:  Heart strain (\"ischemia\").  Tachycardia (fast heart rate or irregular heart rate).  Hypertension  Pulmonary Hypertension  Psychosis  Dependency and abuse has occurred in some.    We do not recommend taking it in combination with the following medications due to potential drug interactions which can increase the risk of side effects and/or potential for seizures:    Absolutely contraindicated are:  Amphetamines or other stimulants like ADHD medication: (dextroamphetamine, amphetamine, diethylpropion, isocarboxazid, methamphetamine, lisdexamfetamine, benzphetamine,dexmethylphenidate, methylphenidate, selegiline patch, sibutramine, tranylcypromine.    Avoid use with:   Dopamine, dobutamine, ephedra, ephedrine, epinephrine, isoproterenol, linezolid, norepinephrine, phenylephrine injection, venlafaxine (Effexor).    Monitor or modify dose with:  Acebutolol, atenolol, betaxolol, bisoprolol, carvedilol, droxidopa, esmolol, labetalol, magnesium citrate, metoprolol, nadolol, nebivolol, penbutolol, pindolol, propranolol, " sotalol, timolol.    Caution with: armodafinil,betaxolol eye drops, brexpiprazole, bupropion, busulfan, caffeine, carteolol drops, enzalutaminde, ginseng, green tea, guarana, levobunolol drops, lindane cream, modafinil, afrin nasal spray (oxymetazoline), pamabrom, phenylephrine oral and nasal spray, pseudoephedrine (sudafed), rasgiline, sleegiline, bowel prep, tiagabine, timolol drops,  TRAMADOL due to increased risk of seizures.    The current cheapest place to fill your prescription is at [facility], [facility] or the [facility] on the first level of this building and is $20-$30 for 90 tablets.  Occasionally, [facility] and [facility] have price matched, so call around and get the best price for you.  Other pharmacies may charge closer to$70- $100 for the same prescription. You don't have to be a member to use the pharmacy at [facility] currently.      Dosing:  We start with half a tablet for the first 2 weeks and if tolerating it without problems, you can take up to one full tablet daily in the morning after breakfast.  For those with evening hunger problems, sometimes half a tablet in the morning and half a tablet around 1-2 pm can be effective, however, risks of nighttime insomnia/restless increase with afternoon dosing so call me at the clinic if considering this regimen or having any issues.  You only have to use the amount effective for you, not to exceed one full tablet.  It can also be used situationaly and does not have to be taken every day. As always, if any questions give us a call the Weight Loss Clinic at [phone] or message me through [employer].

## 2020-03-10 ENCOUNTER — TELEPHONE (OUTPATIENT)
Dept: DERMATOLOGY | Facility: CLINIC | Age: 45
End: 2020-03-10

## 2020-03-10 NOTE — TELEPHONE ENCOUNTER
3/10 Provided phone number [phone] to schedule in about 3 months (around 5/7/2020).    Jennifer Muir   Procedure    Ortho/Sports Med/Ent/Eye   [facility]   [phone]

## 2020-04-30 ENCOUNTER — RESULTS ONLY (OUTPATIENT)
Dept: LAB | Age: 45
End: 2020-04-30

## 2020-04-30 DIAGNOSIS — Z98.84 BARIATRIC SURGERY STATUS: ICD-10-CM

## 2020-04-30 DIAGNOSIS — K91.2 POSTSURGICAL MALABSORPTION: ICD-10-CM

## 2020-04-30 LAB
ERYTHROCYTE [DISTWIDTH] IN BLOOD BY AUTOMATED COUNT: 11.7 % (ref 10–15)
FERRITIN SERPL-MCNC: 210 NG/ML (ref 12–150)
HCT VFR BLD AUTO: 43.4 % (ref 35–47)
HGB BLD-MCNC: 15 G/DL (ref 11.7–15.7)
IRON SATN MFR SERPL: 52 % (ref 15–46)
IRON SERPL-MCNC: 146 UG/DL (ref 35–180)
MCH RBC QN AUTO: 32.4 PG (ref 26.5–33)
MCHC RBC AUTO-ENTMCNC: 34.6 G/DL (ref 31.5–36.5)
MCV RBC AUTO: 94 FL (ref 78–100)
PLATELET # BLD AUTO: 298 10E9/L (ref 150–450)
PTH-INTACT SERPL-MCNC: 38 PG/ML (ref 18–80)
RBC # BLD AUTO: 4.63 10E12/L (ref 3.8–5.2)
TIBC SERPL-MCNC: 281 UG/DL (ref 240–430)
VIT B12 SERPL-MCNC: 1697 PG/ML (ref 193–986)
WBC # BLD AUTO: 6.2 10E9/L (ref 4–11)

## 2020-04-30 PROCEDURE — 82728 ASSAY OF FERRITIN: CPT | Performed by: PHYSICIAN ASSISTANT

## 2020-04-30 PROCEDURE — 82306 VITAMIN D 25 HYDROXY: CPT | Performed by: PHYSICIAN ASSISTANT

## 2020-04-30 PROCEDURE — 83970 ASSAY OF PARATHORMONE: CPT | Performed by: PHYSICIAN ASSISTANT

## 2020-04-30 PROCEDURE — 83540 ASSAY OF IRON: CPT | Performed by: PHYSICIAN ASSISTANT

## 2020-04-30 PROCEDURE — 85027 COMPLETE CBC AUTOMATED: CPT | Performed by: PHYSICIAN ASSISTANT

## 2020-04-30 PROCEDURE — 83550 IRON BINDING TEST: CPT | Performed by: PHYSICIAN ASSISTANT

## 2020-04-30 PROCEDURE — 82607 VITAMIN B-12: CPT | Performed by: PHYSICIAN ASSISTANT

## 2020-04-30 PROCEDURE — 36415 COLL VENOUS BLD VENIPUNCTURE: CPT | Performed by: PHYSICIAN ASSISTANT

## 2020-05-01 DIAGNOSIS — Z98.84 BARIATRIC SURGERY STATUS: ICD-10-CM

## 2020-05-01 DIAGNOSIS — K91.2 POSTSURGICAL MALABSORPTION: ICD-10-CM

## 2020-05-01 LAB — DEPRECATED CALCIDIOL+CALCIFEROL SERPL-MC: 92 UG/L (ref 20–75)

## 2020-05-02 LAB
COVID-19 SPIKE RBD ABY TITER: NORMAL
COVID-19 SPIKE RBD ABY: NEGATIVE

## 2020-05-12 ENCOUNTER — VIRTUAL VISIT (OUTPATIENT)
Dept: SURGERY | Facility: CLINIC | Age: 45
End: 2020-05-12
Payer: COMMERCIAL

## 2020-05-12 VITALS — WEIGHT: 149.2 LBS | BODY MASS INDEX: 27.46 KG/M2 | HEIGHT: 62 IN

## 2020-05-12 DIAGNOSIS — Z53.9 ERRONEOUS ENCOUNTER--DISREGARD: Primary | ICD-10-CM

## 2020-05-12 ASSESSMENT — MIFFLIN-ST. JEOR: SCORE: 1288.99

## 2020-05-12 NOTE — PROGRESS NOTES
Unable to reach patient after 2 attempts.  Left message to contact scheduling for another appointment.      Shu Mcclendon MS, PADestinC

## 2020-08-29 NOTE — PROGRESS NOTES
"Monica Mcgee is a 45 year old female who is being evaluated via a billable video visit.      The patient has been notified of following:     \"This video visit will be conducted via a call between you and your physician/provider. We have found that certain health care needs can be provided without the need for an in-person physical exam.  This service lets us provide the care you need with a video conversation.  If a prescription is necessary we can send it directly to your pharmacy.  If lab work is needed we can place an order for that and you can then stop by our lab to have the test done at a later time.    Video visits are billed at different rates depending on your insurance coverage.  Please reach out to your insurance provider with any questions.    If during the course of the call the physician/provider feels a video visit is not appropriate, you will not be charged for this service.\"    Patient has given verbal consent for Video visit? Yes  How would you like to obtain your AVS? MyChart  If you are dropped from the video visit, the video invite should be resent to: Text to cell phone: [phone]  Will anyone else be joining your video visit? No        Video-Visit Details    Type of service:  Video Visit    Video Start Time: 9:46 AM  Video End Time: 10:04 AM    Originating Location (pt. Location): Home    Distant Location (provider location):  [facility]     Platform used for Video Visit: Hemanth Salazar PA-C      2020    Return Medical Weight Management Note     Monica Mcgee  MRN:  [id number]  :  1975      Dear Shaila Flores,    I had the pleasure of doing a virtual visit with your patient Monica Mcgee.  She is a 45 year old female who I am continIng to see for treatment of obesity. Pt had laparoscopic gastric bypass on 3/9/2016.  .     No flowsheet data found.    INTERVAL HISTORY:   Monica is 4 yrs s/p gastric bypass " surgery.  She was up 12 lbs from last year at her annual visit in March.  Shu Mcclendon PA-C started her on phentermine. Missed her visit in May.  Pt exericiesed no side effects except insomnia if taking too late in the day.      PT was taking 1/2 tabglet and then working way up    Was  last October.  Was working out 3 times weekly leading up to the wedding but stopped right afterwards. Figured out that she was having a [employer] drink most days of the week for the past year that has 800 calories.  When she found this out last month she stopped drinking it.  Is now having a regular cup of coffee with some creamer.      CURRENT WEIGHT (PATIENT REPORTED):  0 lbs 0 oz    Wt Readings from Last 4 Encounters:   05/12/20 149 lb 3.2 oz (67.7 kg)   03/05/20 152 lb 12.8 oz (69.3 kg)   03/05/20 157 lb 12.8 oz (71.6 kg)   01/08/20 154 lb 6.4 oz (70 kg)       BARIATRIC METRICS:  Current Weight: 152 lb (68.9 kg)(pt reported)  Body mass index is 27.58 kg/m .   Wt change since last visit (lbs): 2.8  Cumulative weight loss (lbs): 85    DIETARY HISTORY  Meals Per Day: 3  Portion size is appropriate but could do a better job with selection fo foods.  Is leaning more on convenience  foods.       Social Hx:  She got  in October in [facility].  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      Exercise:   Pt is not exercising.  States she needs to be more intentional it walking at lunch break.        ROS:  General  Fatigue: yes  Sleep Quality:good  HEENT  Hx of glaucoma: No  Vision changes: No  Cardiovascular  Chest Pain with Exertion: No  Palpitations: No  Hx of heart disease: No  Pulmonary  Shortness of breath at rest: No  Shortness of breath with exertion: No  Snoring: some  Gastrourinary  History of kidney stones: No  Psychiatric  Moods Stable: Yes  Anxiety: No  Depression : No  History of drug abuse: No  Endocrine  Polydipsia: No  Polyuria: No  Neurologic:  Hx of  "seizures: No  Hx of paresthesias No     Birth Control: Mirena     BP Readings from Last 6 Encounters:   03/05/20 120/83   01/08/20 127/84   11/05/19 131/84   10/14/19 131/84   10/03/19 127/85   03/28/19 129/86       MEDICATIONS:   Current Outpatient Medications   Medication     azelaic acid (FINACIA) 15 % external gel     Calcium-Phosphorus-Vitamin D (CITRACAL +D3 PO)     Cholecalciferol (VITAMIN D3 PO)     cyanocobalamin (CYANOCOBALAMIN) 1000 MCG/ML injection     levonorgestrel (MIRENA) 20 MCG/24HR IUD     multivitamin  peds with iron (FLINTSTONES COMPLETE) 60 MG chewable tablet     Omega-3 Fatty Acids (FISH OIL PO)     phentermine (ADIPEX-P) 37.5 MG tablet     syringe/needle, disp, (BD INTEGRA SYRINGE) 25G X 1\" 3 ML MISC     tretinoin (RETIN-A) 0.025 % external cream     vitamin (B COMPLEX-C) tablet     No current facility-administered medications for this visit.        LABS:    Hemoglobin A1C   Date Value Ref Range Status   11/06/2019 4.9 0 - 5.6 % Final     Comment:     Normal <5.7% Prediabetes 5.7-6.4%  Diabetes 6.5% or higher - adopted from ADA   consensus guidelines.       Vitamin D Deficiency screening   Date Value Ref Range Status   04/30/2020 92 (H) 20 - 75 ug/L Final     Comment:     Season, race, dietary intake, and treatment affect the concentration of   25-hydroxy-Vitamin D. Values may decrease during winter months and increase   during summer months. Values 20-29 ug/L may indicate Vitamin D insufficiency   and values <20 ug/L may indicate Vitamin D deficiency.  Vitamin D determination is routinely performed by an immunoassay specific for   25 hydroxyvitamin D3.  If an individual is on vitamin D2 (ergocalciferol)   supplementation, please specify 25 OH vitamin D2 and D3 level determination by   LCMSMS test [id number].       Parathyroid Hormone Intact   Date Value Ref Range Status   04/30/2020 38 18 - 80 pg/mL Final     Vitamin B12   Date Value Ref Range Status   04/30/2020 1,697 (H) 193 - 986 pg/mL Final "     Hemoglobin   Date Value Ref Range Status   04/30/2020 15.0 11.7 - 15.7 g/dL Final     Ferritin   Date Value Ref Range Status   04/30/2020 210 (H) 12 - 150 ng/mL Final     Iron   Date Value Ref Range Status   04/30/2020 146 35 - 180 ug/dL Final     Iron Binding Cap   Date Value Ref Range Status   04/30/2020 281 240 - 430 ug/dL Final     Iron Saturation Index   Date Value Ref Range Status   04/30/2020 52 (H) 15 - 46 % Final   11/06/2019 43 15 - 46 % Final   03/28/2019 47 (H) 15 - 46 % Final        PE:  There were no vitals taken for this visit.  GENERAL: Healthy, alert and no distress  EYES: Eyes grossly normal to inspection.  No discharge or erythema, or obvious scleral/conjunctival abnormalities.  RESP: No audible wheeze, cough, or visible cyanosis.  No visible retractions or increased work of breathing.    SKIN: Visible skin clear. No significant rash, abnormal pigmentation or lesions.  NEURO: Cranial nerves grossly intact.  Mentation and speech appropriate for age.  PSYCH: Mentation appears normal, affect normal/bright, judgement and insight intact, normal speech and appearance well-groomed.    ASSESSMENT AND PLAN:      1. Overweight  Patient was congratulated on weight maintenance during COVID. Healthy habits to assist with further weight loss were discussed. Monica will restart phentermine.  - phentermine (ADIPEX-P) 37.5 MG tablet; Take 0.5 tablets (18.75 mg) by mouth every morning If tolerating but hunger not controlled can increase to a full tablet after 1 week.  Dispense: 30 tablet; Refill: 1    Patient will check blood pressure/pulse 7 days after starting.  If BP above 140/90 or pulse is greater than 100 will contact clinic.    She will ask for time for herself between 12-1.   Knowing that her team is very respectable and would honer this. She will schedule in a walk between 12-1.      Recommended pt see diet in 6 weeks.  PT declined. Feels like she has the dietary knowledge just needs to implement it.  Written sample menu and post op guidelines in AVS.    2. Bariatric surgery status  S/P gastric bypass 3/9/2016 BRP     3. Postsurgical malabsorption  Continue taking recommended post-op vitamins.      FOLLOW-UP:  Return to clinic in 6 weeks. (will review diet hx at that visit)

## 2020-09-01 ENCOUNTER — TELEPHONE (OUTPATIENT)
Dept: SURGERY | Facility: CLINIC | Age: 45
End: 2020-09-01

## 2020-09-01 ENCOUNTER — VIRTUAL VISIT (OUTPATIENT)
Dept: SURGERY | Facility: CLINIC | Age: 45
End: 2020-09-01
Payer: COMMERCIAL

## 2020-09-01 VITALS — HEIGHT: 62 IN | WEIGHT: 152 LBS | BODY MASS INDEX: 27.97 KG/M2

## 2020-09-01 DIAGNOSIS — K91.2 POSTSURGICAL MALABSORPTION: ICD-10-CM

## 2020-09-01 DIAGNOSIS — E66.3 OVERWEIGHT: Primary | ICD-10-CM

## 2020-09-01 DIAGNOSIS — Z98.84 BARIATRIC SURGERY STATUS: ICD-10-CM

## 2020-09-01 PROCEDURE — 99213 OFFICE O/P EST LOW 20 MIN: CPT | Mod: 95 | Performed by: PHYSICIAN ASSISTANT

## 2020-09-01 RX ORDER — PHENTERMINE HYDROCHLORIDE 37.5 MG/1
0.5 TABLET ORAL EVERY MORNING
Qty: 30 TABLET | Refills: 1 | Status: SHIPPED | OUTPATIENT
Start: 2020-09-01 | End: 2020-10-27

## 2020-09-01 ASSESSMENT — MIFFLIN-ST. JEOR: SCORE: 1296.69

## 2020-09-01 NOTE — PATIENT INSTRUCTIONS
We started phentermine (ADIPEX-P) 37.5 MG tablet Take 0.5 tablets (18.75 mg) by mouth every morning If tolerating but hunger not controlled can increase to a full tablet after 1 week.      Please get blood pressure checked 1-2 weeks after starting phentermine. If greater than 140/90 contact clinic. Can get BP at  Pharmacy, Primary care office (nurse visit),[facility] in [facility]. Call [phone] for appt.     Goals:   Remember your team is very respectable. Ask for time for yourself between 12-1.     Plan walk between 12-1.      FOLLOW-UP:  Return to clinic in 6 weeks.       MEDICATION STARTED AT THIS APPOINTMENT    We are starting Phentermine. Take one tablet in the morning.  Call the nurse at [phone] if you have any questions or concerns. (Do not stop taking it if you don't think it's working. For some people it works without them knowing it.)    Phentermine is being prescribed because you identified hunger as one of the main causes for your extra weight.      Our patients on Phentermine find that they:    >feel less hunger    >find it easier to push the plate away   >have an easier time eating less    For some of our patients, these feelings are very real and immediate. For other patients, the feelings are less obvious. They don't feel much of a change but find they've lost weight. Like all weight loss medications, Phentermine  works best when you help it work. This means:  1. Having less tempting high calorie (fattening) food around the house or office. (For people with strong cravings this is very important.)   2. Staying away from situations or people that may trigger your cravings .   3. Eating out only one time or less each week.  4. Eating your meals at a table with the TV or computer off.    Side-effects. Phentermine is generally well tolerated. The main side-effects we see are feelings of racing pulse or rapid heart beat. Some people can get an elevated blood  pressure. Because of this we may have you come back within a week or so of starting the medication for a blood pressure check.       In order to get refills of this or any medication we prescribe you must be seen in the medical weight mgmt clinic every 2-3 months.    Bariatric Post Op Guidelines  General:    To avoid marginal ulcers avoid all forms of tobacco, alcohol in excess, caffeine, and NSAIDS (unless indicated for cardioprotection or othewise and opposed by a PPI).    Exercise is key for continued weight loss and weight maintenance. Aim for 30-60 minutes of physical activity most days of the week. Include cardiovascular and strength training.    Continue lifelong vitamins supplementation and annual lab follow up.  All  patients should supplement with the following bariatric postoperative vitamins:  2 Complete multivitamins with minerals (at different times than calcium)  Vitamin D 5000 Int Units/125 mg daily   Calcium 600 mg twice daily or 500 mg hree times daily   Vitamin B12: 500 mcg of sl daily or 1000 mcg Inj monthly  B complex daily or Thiamine 100 mg weekly  1 Iron/Vit C. Daily for females who menstruate and/or as directed    The bariatric team should be aware and potentially evaluate all adverse gastrointestinal symptoms which can be a sign of complication. Inability to tolerate textured solid food (chicken, steak, fish) may need to be evaluated by endoscopy.    There is a 10% increase of Alcohol Use Disorder in patients with bariatric surgery.   Most often occurring around 2 years post op.  Please call the clinic if you feel alcohol is interfering in your daily life.  We can help.     Follow up annually lifelong. Obesity is a chronic disease.  Weight gain can be expected. The goal of follow-up visits is to ensure adequate vitamin and protein absorption, evaluate food intake behavior, review exercise/activity level, and assist with weight regain.    Nutritional:  Eat 3 meals per day  (No snacks between  meals.)  Do not skip meals.  This can cause overeating at the next meal and will prevent adequate protein and nutritional intake.    Aim for 60-80 grams of protein per day.  Always eat your protein first. This assists with optimal nutrition and helps you stay full longer.    Eat your protein first, and then follow with fiber.    Add fiber by including fruits, vegetables, whole grains, and beans.     Portions should be about 1 cup per meal. Use measuring cups to be accurate.  Continue to use saucer/salad plates, infant/toddler silverware to keep portion sizes small and take small bites.    Eat S-L-O-W-L-Y to make each meal last 20-30 minutes. Always stop eating when satisfied.    Aim for 64 oz. of calorie-free fluids daily.    Avoid drinking 30 min before, during, and 30 min after meal    Avoid high sugar and high fat foods to prevent high calorie intake. This will reduce your rate of weight loss and can cause weight regain.   Check nutrition labels for less than 10 grams of sugar and less than 10 grams of fat per serving.      Sample Menu after  Bariatric Surgery    You do NOT need to eat/drink the full portion sizes listed below  Always stop when you are satisfied    Breakfast   cup 1% cottage cheese     cup mixed berries   Lunch 2 oz lean roast beef on   [place] Thin with 1 tsp. light rankin    small tomato, chopped, mixed with 1 tsp. light vinaigrette dressing   Dinner 2 oz grilled salmon    cup salad greens with 1 tsp. light salad dressing and 1 tsp. ground flax seed    cup quinoa or brown rice     Breakfast   cup egg substitute with   cup sautéed chopped vegetables  2 light Bland Krisp crackers   Lunch Tuna Melt:   cup tuna mixed with 1 tsp. light rankin over   [place] Thin. Top with 2-3 slices cucumber and 1 oz slice of low fat cheese   Dinner 3 oz  grilled, broiled, or baked seasoned skinless chicken breast    cup asparagus     Breakfast   cup plain oatmeal made with skim or 1% milk with 1 Tbsp.  flavored/unflavored protein powder added  1 mozzarella string cheese   Lunch 2 oz deli turkey breast  1/3 cup salad with 1 tsp. light salad dressing, 1/8 of a whole avocado and 1 Tbsp. sunflower seeds   Dinner 3 oz. pork loin made in a crock pot, seasoned with a spice rub    cup cooked carrots     Breakfast 1 cup breakfast casserole made with egg substitute, turkey sausage,  and steamed, chopped bell peppers   Lunch 2 oz. teriyaki turkey    cup mashed sweet potato with 1-2 spritzes of spray butter (like Park[place])    cup fresh pineapple   Dinner 3 oz low fat meatloaf    cup roasted garlic zucchini     Breakfast   cup leftover breakfast casserole    cup no sugar added applesauce with 1 Tbsp. unflavored protein powder and a sprinkle of cinnamon    Lunch 3 oz shrimp with 1-2 Tbsp. low-sugar cocktail sauce for dipping    c. whole wheat pasta drizzled with   tsp. olive oil   Dinner Grilled, seasoned kebob with 2 oz lean beef and   cup vegetables     Breakfast Breakfast pizza:   [place] Thin spread with 1 Tbsp. low sugar spaghetti sauce,   cup shredded low fat cheese, melted and 1 slice of Collier leone     cup fresh fruit mixed with chopped almonds   Lunch   cup black bean soup  4-5 whole grain crackers   Dinner 3 oz  tilapia with lemon pepper seasoning    cup stewed tomatoes     Breakfast 2 hard boiled eggs (discard 1 egg yolk)    whole wheat English Muffin with 1 tsp. low sugar jelly   Lunch   cup leftover black bean soup topped with 1-2 Tbsp. low fat cheese  2-3 light Rye Krisp crackers   Dinner 3 oz sirloin steak    cup steamed broccoli

## 2020-09-01 NOTE — TELEPHONE ENCOUNTER
Reason for call:  Other   Patient called regarding (reason for call): appointment  Additional comments: Patient is waiting in the virtual waiting room on [facility] since 9:31AM, but is wondering where the provider is. Please reach out to the patient if unable to connect or are running late, to update with an approximate start time.     Phone number to reach patient:  Home number on file [phone] (home)    Best Time:  As soon as possible    Can we leave a detailed message on this number?  YES    Travel screening: Not Applicable

## 2020-10-19 ENCOUNTER — TELEPHONE (OUTPATIENT)
Dept: SURGERY | Facility: CLINIC | Age: 45
End: 2020-10-19

## 2020-10-19 NOTE — TELEPHONE ENCOUNTER
----- Message from Cele Marinelli RN sent at 10/12/2020 11:31 AM CDT -----  Patient just let us know her BP 9/26/2020 was 122/46.  This was documented in her chart.  She is on phentermine started 9/1/2020.  She is to see you 6 weeks from 9/1/2020.  She was notified of this.  Kelsi GARCIA

## 2020-10-27 ENCOUNTER — TELEPHONE (OUTPATIENT)
Dept: SURGERY | Facility: CLINIC | Age: 45
End: 2020-10-27

## 2020-10-27 ENCOUNTER — VIRTUAL VISIT (OUTPATIENT)
Dept: SURGERY | Facility: CLINIC | Age: 45
End: 2020-10-27
Payer: COMMERCIAL

## 2020-10-27 VITALS — WEIGHT: 145 LBS | HEIGHT: 62 IN | BODY MASS INDEX: 26.68 KG/M2

## 2020-10-27 DIAGNOSIS — E66.3 OVERWEIGHT: Primary | ICD-10-CM

## 2020-10-27 DIAGNOSIS — K91.2 POSTSURGICAL MALABSORPTION: ICD-10-CM

## 2020-10-27 DIAGNOSIS — Z98.84 BARIATRIC SURGERY STATUS: ICD-10-CM

## 2020-10-27 PROCEDURE — 99213 OFFICE O/P EST LOW 20 MIN: CPT | Mod: 95 | Performed by: PHYSICIAN ASSISTANT

## 2020-10-27 RX ORDER — PHENTERMINE HYDROCHLORIDE 37.5 MG/1
0.5 TABLET ORAL EVERY MORNING
Qty: 90 TABLET | Refills: 0 | Status: SHIPPED | OUTPATIENT
Start: 2020-10-27 | End: 2021-04-21

## 2020-10-27 ASSESSMENT — MIFFLIN-ST. JEOR: SCORE: 1264.94

## 2020-10-27 NOTE — TELEPHONE ENCOUNTER
Pharm called for clarification on phentermine dosage. Instructs indicate ramping up, pt told them she's already at 1/day    Pharmacy Contact    Telephone Fax   [phone]

## 2020-10-27 NOTE — TELEPHONE ENCOUNTER
Pt never went up to the full tablet.  She has always been on 1/2 tablet.  Has the ability to increase but has not needed to.

## 2020-11-29 ENCOUNTER — HEALTH MAINTENANCE LETTER (OUTPATIENT)
Age: 45
End: 2020-11-29

## 2021-02-14 ENCOUNTER — HEALTH MAINTENANCE LETTER (OUTPATIENT)
Age: 46
End: 2021-02-14

## 2021-03-17 ENCOUNTER — MYC MEDICAL ADVICE (OUTPATIENT)
Dept: FAMILY MEDICINE | Facility: CLINIC | Age: 46
End: 2021-03-17

## 2021-04-05 ENCOUNTER — TELEPHONE (OUTPATIENT)
Dept: SURGERY | Facility: CLINIC | Age: 46
End: 2021-04-05

## 2021-04-05 DIAGNOSIS — K91.2 POSTSURGICAL MALABSORPTION: ICD-10-CM

## 2021-04-05 DIAGNOSIS — Z98.84 BARIATRIC SURGERY STATUS: ICD-10-CM

## 2021-04-05 NOTE — TELEPHONE ENCOUNTER
Reason for call:  Other   Patient called regarding (reason for call): labs  Additional comments: Please place patient's annual lab orders in the system, so she can get them done before upcoming appointment on 04/12.    Phone number to reach patient:  Home number on file [phone] (home)    Best Time:  Anytime     Can we leave a detailed message on this number?  Not Applicable    Travel screening: Not Applicable

## 2021-04-12 ENCOUNTER — VIRTUAL VISIT (OUTPATIENT)
Dept: SURGERY | Facility: CLINIC | Age: 46
End: 2021-04-12
Payer: COMMERCIAL

## 2021-04-12 VITALS — HEIGHT: 62 IN | BODY MASS INDEX: 26.31 KG/M2 | WEIGHT: 143 LBS

## 2021-04-12 DIAGNOSIS — E66.3 OVERWEIGHT: Primary | ICD-10-CM

## 2021-04-12 DIAGNOSIS — Z98.84 BARIATRIC SURGERY STATUS: ICD-10-CM

## 2021-04-12 DIAGNOSIS — K91.2 POSTSURGICAL MALABSORPTION: ICD-10-CM

## 2021-04-12 PROCEDURE — 99213 OFFICE O/P EST LOW 20 MIN: CPT | Mod: 95 | Performed by: PHYSICIAN ASSISTANT

## 2021-04-12 ASSESSMENT — MIFFLIN-ST. JEOR: SCORE: 1251.89

## 2021-04-12 NOTE — PROGRESS NOTES
Monica is a 45 year old who is being evaluated via a billable video visit.      If the video visit is dropped, the invitation should be resent by: Text to cell phone: [phone]  Will anyone else be joining your video visit? No      Video-Visit Details    Type of service:  Video Visit    Video Start Time: 12:04 PM    Video End Time:12:25 PM        Originating Location (pt. Location): Home    Distant Location (provider location):  [facility] [place]     Platform used for Video Visit: [facility]      BARIATRIC FOLLOW UP      April 12, 2021    HISTORY OF PRESENT ILLNESS: Pt presents today for her follow-up appointment status post laparoscopic gastric bypass.     INTERVAL HISTORY:  Monica is 5 yrs s/p gastric bypass surgery.  Was on 1/2 tablet of phentermine daily.  Was running low for last month took it every other day.  Pt ran out of phentermine about a week ago.  She hasn't noticed much of a difference in her hunger off the phentermine.  Her stress level has been so high she is not sure she would see the difference on or off.  Works on cabin every weekend.      Pt experienced no side effects except insomnia if taking too late in the day.  Pt never went up to a full tablet.  Was  last October.  Bought a cabin.       With her current stress level prior to surgery she would have emotionally ate.  Not she eats less due to her stress. She expects her stress from work to be high until October.          143 lbs 0 oz    Wt Readings from Last 4 Encounters:   04/12/21 143 lb (64.9 kg)   10/27/20 145 lb (65.8 kg)   09/01/20 152 lb (68.9 kg)   05/12/20 149 lb 3.2 oz (67.7 kg)      BARIATRIC METRICS:  Current Weight: 143 lb (64.9 kg)(ptreported)  Body mass index is 26.16 kg/m .   Wt change since last visit (lbs): -2  Cumulative weight loss (lbs): 94    VITAMINS:  Patient is taking the following bariatric postoperative vitamins:  2 Complete multivitamins with minerals (at different times than  calcium)   5000 International Units of Vitamin D daily  5126-8374 mg of Calcium daily in divided doses  No B12  B complex daily    EXERCISE:  Pt is exercising inconsistently.  Is doing yard work.  Bought a cabin and has been working on that all weekend both inside and out.         OBESITY RELATED CONDITIONS:  Diabetes- resolved     SOCIAL HISTORY:  Pt denies smoking.  Pt denies alcohol use.  Avoids NSAIDS.  She got  in October last year in [facility].  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  Recently bought a cabin.      ROS:  General  Fatigue: yes  Sleep Quality:good  HEENT  Hx of glaucoma: No  Vision changes: No  Cardiovascular  Chest Pain with Exertion: No  Palpitations: No  Hx of heart disease: No  Pulmonary  Shortness of breath at rest: No  Shortness of breath with exertion: No  Snoring: some  Gastrourinary  History of kidney stones: No  Psychiatric  Moods Stable: Yes  Anxiety: No  Depression : No  History of drug abuse: No  Endocrine  Polydipsia: No  Polyuria: No  Neurologic:  Hx of seizures: No  Hx of paresthesias No  :  Birth Control: [facility]      LABS/IMAGING/MEDICAL RECORDS REVIEW:   Dexa 6/11/2018  WNL    Hemoglobin A1C   Date Value Ref Range Status   11/06/2019 4.9 0 - 5.6 % Final     Comment:     Normal <5.7% Prediabetes 5.7-6.4%  Diabetes 6.5% or higher - adopted from ADA   consensus guidelines.       Vitamin D Deficiency screening   Date Value Ref Range Status   04/30/2020 92 (H) 20 - 75 ug/L Final     Comment:     Season, race, dietary intake, and treatment affect the concentration of   25-hydroxy-Vitamin D. Values may decrease during winter months and increase   during summer months. Values 20-29 ug/L may indicate Vitamin D insufficiency   and values <20 ug/L may indicate Vitamin D deficiency.  Vitamin D determination is routinely performed by an immunoassay specific for   25 hydroxyvitamin D3.  If an individual is on vitamin D2  "(ergocalciferol)   supplementation, please specify 25 OH vitamin D2 and D3 level determination by   LCMSMS test [id number].       Parathyroid Hormone Intact   Date Value Ref Range Status   04/30/2020 38 18 - 80 pg/mL Final     Vitamin B12   Date Value Ref Range Status   04/30/2020 1,697 (H) 193 - 986 pg/mL Final     Hemoglobin   Date Value Ref Range Status   04/30/2020 15.0 11.7 - 15.7 g/dL Final     Ferritin   Date Value Ref Range Status   04/30/2020 210 (H) 12 - 150 ng/mL Final     Iron   Date Value Ref Range Status   04/30/2020 146 35 - 180 ug/dL Final     Iron Binding Cap   Date Value Ref Range Status   04/30/2020 281 240 - 430 ug/dL Final     Iron Saturation Index   Date Value Ref Range Status   04/30/2020 52 (H) 15 - 46 % Final   11/06/2019 43 15 - 46 % Final   03/28/2019 47 (H) 15 - 46 % Final     BP Readings from Last 6 Encounters:   03/05/20 120/83   01/08/20 127/84   11/05/19 131/84   10/14/19 131/84   10/03/19 127/85   03/28/19 129/86     PHYSICAL EXAMINATION:  Ht 5' 2\" (1.575 m)   Wt 143 lb (64.9 kg)   BMI 26.16 kg/m      GENERAL: Healthy, alert and no distress  EYES: Eyes grossly normal to inspection.  No discharge or erythema, or obvious scleral/conjunctival abnormalities.  RESP: No audible wheeze, cough, or visible cyanosis.  No visible retractions or increased work of breathing.    SKIN: Visible skin clear. No significant rash, abnormal pigmentation or lesions.  NEURO: Cranial nerves grossly intact.  Mentation and speech appropriate for age.  PSYCH: Mentation appears normal, affect normal/bright, judgement and insight intact, normal speech and appearance well-groomed.      ASSESSMENT AND PLAN:      Bariatric surgery status  S/P gastric bypass 3/9/2016 BRP     Postsurgical malabsorption  Continue taking recommended post-op vitamins.  Labs ordered per protocol.  - Vitamin A (Bypass/Sleeve); Future  - CBC with platelets; Future  - Vitamin B12; Future  - Vitamin D Screen; Future  - Parathyroid Hormone " Intact; Future  - Iron and Iron Binding Capacity; Future  - Ferritin; Future    Overweight  Patient was congratulated on weight loss success thus far. Healthy habits to assist with further weight loss were discussed. She will stop phentermine.      She will continue to work on boundaries. She will schedule [place] meeting 12-1 for lunch and check in time daily. (Mental Health stress management impotent in weight loss.  Keeps cortisol levels low.)   Knowing that her team is very respectable and would honer this.    Recommended pt see diet for annual. Pt declined.      FOLLOW-UP:  Return to clinic in 1 year for annual or sooner if weight increases or hunger not controlled.      26 minutes spent on the date of the encounter doing chart review, history and exam, review test results, counseling, developing plan of care, documentation, and further activities as noted above.

## 2021-04-12 NOTE — PATIENT INSTRUCTIONS
Nice to talk with you today.  Below is our plan we discussed.-  PATIENCE [place]    Have labs drawn.  Schedule [place] meeting 12-1 for lunch and check in time daily. (Mental Health stress management impotent in weight loss.  Keeps cortisol levels low.)    FOLLOW-UP:  Return to clinic in 1 year for annual or sooner if weight increases or hunger not controlled.       Bariatric Post Op Guidelines  General:    To avoid marginal ulcers avoid all forms of tobacco, alcohol in excess, caffeine, and NSAIDS (unless indicated for cardioprotection or othewise and opposed by a PPI).    Exercise is key for continued weight loss and weight maintenance. Aim for 30-60 minutes of physical activity most days of the week. Include cardiovascular and strength training.    Continue lifelong vitamins supplementation and annual lab follow up.  All  patients should supplement with the following bariatric postoperative vitamins:  2 Complete multivitamins with minerals (at different times than calcium)  Vitamin D 5000 Int Units/125 mg daily   Calcium 600 mg twice daily or 500 mg hree times daily   Vitamin B12: 500 mcg of sl daily or 1000 mcg Inj monthly  B complex daily or Thiamine 100 mg weekly  1 Iron/Vit C. Daily for females who menstruate and/or as directed    The bariatric team should be aware and potentially evaluate all adverse gastrointestinal symptoms which can be a sign of complication. Inability to tolerate textured solid food (chicken, steak, fish) may need to be evaluated by endoscopy.    There is a 10% increase of Alcohol Use Disorder in patients with bariatric surgery.   Most often occurring around 2 years post op.  Please call the clinic if you feel alcohol is interfering in your daily life.  We can help.     Follow up annually lifelong. Obesity is a chronic disease.  Weight gain can be expected. The goal of follow-up visits is to ensure adequate vitamin and protein absorption, evaluate food intake behavior, review  exercise/activity level, and assist with weight regain.    Nutritional:  Eat 3 meals per day  (No snacks between meals.)  Do not skip meals.  This can cause overeating at the next meal and will prevent adequate protein and nutritional intake.    Aim for 60-80 grams of protein per day.  Always eat your protein first. This assists with optimal nutrition and helps you stay full longer.    Eat your protein first, and then follow with fiber.    Add fiber by including fruits, vegetables, whole grains, and beans.     Portions should be about 1 cup per meal. Use measuring cups to be accurate.  Continue to use saucer/salad plates, infant/toddler silverware to keep portion sizes small and take small bites.    Eat S-L-O-W-L-Y to make each meal last 20-30 minutes. Always stop eating when satisfied.    Aim for 64 oz. of calorie-free fluids daily.    Avoid drinking 30 min before, during, and 30 min after meal    Avoid high sugar and high fat foods to prevent high calorie intake. This will reduce your rate of weight loss and can cause weight regain.   Check nutrition labels for less than 10 grams of sugar and less than 10 grams of fat per serving.          Sample Menu after  Bariatric Surgery    You do NOT need to eat/drink the full portion sizes listed below  Always stop when you are satisfied    Breakfast   cup 1% cottage cheese     cup mixed berries   Lunch 2 oz lean roast beef on   [place] Thin with 1 tsp. light rankin    small tomato, chopped, mixed with 1 tsp. light vinaigrette dressing   Dinner 2 oz grilled salmon    cup salad greens with 1 tsp. light salad dressing and 1 tsp. ground flax seed    cup quinoa or brown rice     Breakfast   cup egg substitute with   cup sautéed chopped vegetables  2 light [place] Krisp crackers   Lunch Tuna Melt:   cup tuna mixed with 1 tsp. light rankin over   [place] Thin. Top with 2-3 slices cucumber and 1 oz slice of low fat cheese   Dinner 3 oz  grilled, broiled, or baked seasoned skinless  chicken breast    cup asparagus     Breakfast   cup plain oatmeal made with skim or 1% milk with 1 Tbsp. flavored/unflavored protein powder added  1 mozzarella string cheese   Lunch 2 oz deli turkey breast  1/3 cup salad with 1 tsp. light salad dressing, 1/8 of a whole avocado and 1 Tbsp. sunflower seeds   Dinner 3 oz. pork loin made in a crock pot, seasoned with a spice rub    cup cooked carrots     Breakfast 1 cup breakfast casserole made with egg substitute, turkey sausage,  and steamed, chopped bell peppers   Lunch 2 oz. teriyaki turkey    cup mashed sweet potato with 1-2 spritzes of spray butter (like [place])    cup fresh pineapple   Dinner 3 oz low fat meatloaf    cup roasted garlic zucchini     Breakfast   cup leftover breakfast casserole    cup no sugar added applesauce with 1 Tbsp. unflavored protein powder and a sprinkle of cinnamon    Lunch 3 oz shrimp with 1-2 Tbsp. low-sugar cocktail sauce for dipping    c. whole wheat pasta drizzled with   tsp. olive oil   Dinner Grilled, seasoned kebob with 2 oz lean beef and   cup vegetables     Breakfast Breakfast pizza:   [place] Thin spread with 1 Tbsp. low sugar spaghetti sauce,   cup shredded low fat cheese, melted and 1 slice of [place] leone     cup fresh fruit mixed with chopped almonds   Lunch   cup black bean soup  4-5 whole grain crackers   Dinner 3 oz  tilapia with lemon pepper seasoning    cup stewed tomatoes     Breakfast 2 hard boiled eggs (discard 1 egg yolk)    whole wheat English Muffin with 1 tsp. low sugar jelly   Lunch   cup leftover black bean soup topped with 1-2 Tbsp. low fat cheese  2-3 light [place] Krisp crackers   Dinner 3 oz sirloin steak    cup steamed broccoli

## 2021-04-15 DIAGNOSIS — K91.2 POSTSURGICAL MALABSORPTION: ICD-10-CM

## 2021-04-15 DIAGNOSIS — Z13.220 LIPID SCREENING: ICD-10-CM

## 2021-04-15 DIAGNOSIS — Z11.4 ENCOUNTER FOR SCREENING FOR HIV: ICD-10-CM

## 2021-04-15 DIAGNOSIS — E11.9 DIET-CONTROLLED DIABETES MELLITUS (H): ICD-10-CM

## 2021-04-15 DIAGNOSIS — Z11.59 NEED FOR HEPATITIS C SCREENING TEST: ICD-10-CM

## 2021-04-15 LAB
ALBUMIN SERPL-MCNC: 3.9 G/DL (ref 3.4–5)
ALP SERPL-CCNC: 37 U/L (ref 40–150)
ALT SERPL W P-5'-P-CCNC: 26 U/L (ref 0–50)
ANION GAP SERPL CALCULATED.3IONS-SCNC: 2 MMOL/L (ref 3–14)
AST SERPL W P-5'-P-CCNC: 15 U/L (ref 0–45)
BILIRUB SERPL-MCNC: 0.6 MG/DL (ref 0.2–1.3)
BUN SERPL-MCNC: 17 MG/DL (ref 7–30)
CALCIUM SERPL-MCNC: 8.8 MG/DL (ref 8.5–10.1)
CHLORIDE SERPL-SCNC: 104 MMOL/L (ref 94–109)
CHOLEST SERPL-MCNC: 154 MG/DL
CO2 SERPL-SCNC: 29 MMOL/L (ref 20–32)
CREAT SERPL-MCNC: 0.66 MG/DL (ref 0.52–1.04)
CREAT UR-MCNC: 215 MG/DL
DEPRECATED CALCIDIOL+CALCIFEROL SERPL-MC: 64 UG/L (ref 20–75)
ERYTHROCYTE [DISTWIDTH] IN BLOOD BY AUTOMATED COUNT: 12 % (ref 10–15)
FERRITIN SERPL-MCNC: 130 NG/ML (ref 12–150)
GFR SERPL CREATININE-BSD FRML MDRD: >90 ML/MIN/{1.73_M2}
GLUCOSE SERPL-MCNC: 85 MG/DL (ref 70–99)
HBA1C MFR BLD: 5 % (ref 0–5.6)
HCT VFR BLD AUTO: 42.9 % (ref 35–47)
HCV AB SERPL QL IA: NONREACTIVE
HDLC SERPL-MCNC: 83 MG/DL
HGB BLD-MCNC: 15.1 G/DL (ref 11.7–15.7)
HIV 1+2 AB+HIV1 P24 AG SERPL QL IA: NONREACTIVE
IRON SATN MFR SERPL: 44 % (ref 15–46)
IRON SERPL-MCNC: 124 UG/DL (ref 35–180)
LDLC SERPL CALC-MCNC: 59 MG/DL
MCH RBC QN AUTO: 32.7 PG (ref 26.5–33)
MCHC RBC AUTO-ENTMCNC: 35.2 G/DL (ref 31.5–36.5)
MCV RBC AUTO: 93 FL (ref 78–100)
MICROALBUMIN UR-MCNC: 12 MG/L
MICROALBUMIN/CREAT UR: 5.58 MG/G CR (ref 0–25)
NONHDLC SERPL-MCNC: 71 MG/DL
PLATELET # BLD AUTO: 288 10E9/L (ref 150–450)
POTASSIUM SERPL-SCNC: 3.7 MMOL/L (ref 3.4–5.3)
PROT SERPL-MCNC: 7.3 G/DL (ref 6.8–8.8)
PTH-INTACT SERPL-MCNC: 34 PG/ML (ref 18–80)
RBC # BLD AUTO: 4.62 10E12/L (ref 3.8–5.2)
SODIUM SERPL-SCNC: 135 MMOL/L (ref 133–144)
TIBC SERPL-MCNC: 280 UG/DL (ref 240–430)
TRIGL SERPL-MCNC: 59 MG/DL
TSH SERPL DL<=0.005 MIU/L-ACNC: 1.63 MU/L (ref 0.4–4)
VIT B12 SERPL-MCNC: 535 PG/ML (ref 193–986)
WBC # BLD AUTO: 6.7 10E9/L (ref 4–11)

## 2021-04-15 PROCEDURE — 99000 SPECIMEN HANDLING OFFICE-LAB: CPT | Performed by: PHYSICIAN ASSISTANT

## 2021-04-15 PROCEDURE — 82728 ASSAY OF FERRITIN: CPT | Performed by: PHYSICIAN ASSISTANT

## 2021-04-15 PROCEDURE — 84443 ASSAY THYROID STIM HORMONE: CPT | Performed by: PHYSICIAN ASSISTANT

## 2021-04-15 PROCEDURE — 82607 VITAMIN B-12: CPT | Performed by: PHYSICIAN ASSISTANT

## 2021-04-15 PROCEDURE — 83540 ASSAY OF IRON: CPT | Performed by: PHYSICIAN ASSISTANT

## 2021-04-15 PROCEDURE — 36415 COLL VENOUS BLD VENIPUNCTURE: CPT | Performed by: PHYSICIAN ASSISTANT

## 2021-04-15 PROCEDURE — 84590 ASSAY OF VITAMIN A: CPT | Mod: 90 | Performed by: PHYSICIAN ASSISTANT

## 2021-04-15 PROCEDURE — 86803 HEPATITIS C AB TEST: CPT | Performed by: PHYSICIAN ASSISTANT

## 2021-04-15 PROCEDURE — 87389 HIV-1 AG W/HIV-1&-2 AB AG IA: CPT | Performed by: PHYSICIAN ASSISTANT

## 2021-04-15 PROCEDURE — 83970 ASSAY OF PARATHORMONE: CPT | Performed by: PHYSICIAN ASSISTANT

## 2021-04-15 PROCEDURE — 82043 UR ALBUMIN QUANTITATIVE: CPT | Performed by: FAMILY MEDICINE

## 2021-04-15 PROCEDURE — 82306 VITAMIN D 25 HYDROXY: CPT | Performed by: PHYSICIAN ASSISTANT

## 2021-04-15 PROCEDURE — 83550 IRON BINDING TEST: CPT | Performed by: PHYSICIAN ASSISTANT

## 2021-04-15 PROCEDURE — 80053 COMPREHEN METABOLIC PANEL: CPT | Performed by: PHYSICIAN ASSISTANT

## 2021-04-15 PROCEDURE — 83036 HEMOGLOBIN GLYCOSYLATED A1C: CPT | Performed by: PHYSICIAN ASSISTANT

## 2021-04-15 PROCEDURE — 80061 LIPID PANEL: CPT | Performed by: PHYSICIAN ASSISTANT

## 2021-04-15 PROCEDURE — 85027 COMPLETE CBC AUTOMATED: CPT | Performed by: PHYSICIAN ASSISTANT

## 2021-04-18 LAB
ANNOTATION COMMENT IMP: NORMAL
RETINYL PALMITATE SERPL-MCNC: <0.02 MG/L (ref 0–0.1)
VIT A SERPL-MCNC: 0.66 MG/L (ref 0.3–1.2)

## 2021-04-19 ASSESSMENT — ANXIETY QUESTIONNAIRES
IF YOU CHECKED OFF ANY PROBLEMS ON THIS QUESTIONNAIRE, HOW DIFFICULT HAVE THESE PROBLEMS MADE IT FOR YOU TO DO YOUR WORK, TAKE CARE OF THINGS AT HOME, OR GET ALONG WITH OTHER PEOPLE: SOMEWHAT DIFFICULT
1. FEELING NERVOUS, ANXIOUS, OR ON EDGE: SEVERAL DAYS
3. WORRYING TOO MUCH ABOUT DIFFERENT THINGS: SEVERAL DAYS
5. BEING SO RESTLESS THAT IT IS HARD TO SIT STILL: SEVERAL DAYS
GAD7 TOTAL SCORE: 7
7. FEELING AFRAID AS IF SOMETHING AWFUL MIGHT HAPPEN: NOT AT ALL
6. BECOMING EASILY ANNOYED OR IRRITABLE: MORE THAN HALF THE DAYS
2. NOT BEING ABLE TO STOP OR CONTROL WORRYING: SEVERAL DAYS

## 2021-04-19 ASSESSMENT — PATIENT HEALTH QUESTIONNAIRE - PHQ9
5. POOR APPETITE OR OVEREATING: SEVERAL DAYS
SUM OF ALL RESPONSES TO PHQ QUESTIONS 1-9: 6

## 2021-04-19 NOTE — PROGRESS NOTES
SUBJECTIVE:   CC: Monica Walter is an 45 year old woman who presents for preventive health visit.       Patient has been advised of split billing requirements and indicates understanding: Yes  Healthy Habits:    Do you get at least three servings of calcium containing foods daily (dairy, green leafy vegetables, etc.)? no    Amount of exercise or daily activities, outside of work: none    Problems taking medications regularly No    Medication side effects: No    Have you had an eye exam in the past two years? yes    Do you see a dentist twice per year? yes    Do you have sleep apnea, excessive snoring or daytime drowsiness?no      PROBLEMS TO ADD ON...    Bariatric surgery status. She is 5 year s/p gastric bypass. Still following with the weight loss clinic.   Medical problems to include hypertension, hyperlipidemia and diabetes has since resolved.   Recently completed annual labs.     HEALTH CARE MAINTENANCE: Due for a Pap smear and Mammogram.       Today's PHQ-2 Score:   PHQ-2 ( 1999 Pfizer) 4/19/2021 1/8/2020   Q1: Little interest or pleasure in doing things 1 0   Q2: Feeling down, depressed or hopeless 1 0   PHQ-2 Score 2 0       Abuse: Current or Past(Physical, Sexual or Emotional)- No  Do you feel safe in your environment? Yes    Have you ever done Advance Care Planning? (For example, a Health Directive, POLST, or a discussion with a medical provider or your loved ones about your wishes): No, advance care planning information given to patient to review.  Patient plans to discuss their wishes with loved ones or provider.      Social History     Tobacco Use     Smoking status: Never Smoker     Smokeless tobacco: Never Used   Substance Use Topics     Alcohol use: No     If you drink alcohol do you typically have >3 drinks per day or >7 drinks per week? No                     Reviewed orders with patient.  Reviewed health maintenance and updated orders accordingly - Yes  Lab work is in process  Labs reviewed  in EPIC  BP Readings from Last 3 Encounters:   21 128/85   20 120/83   20 127/84    Wt Readings from Last 3 Encounters:   21 67.3 kg (148 lb 6 oz)   21 64.9 kg (143 lb)   10/27/20 65.8 kg (145 lb)                  Patient Active Problem List   Diagnosis     Hyperlipidemia LDL goal <100     Dysthymic disorder     Bariatric surgery status     External hemorrhoids with complication     Body mass index (BMI) of 28.0 to 28.9 in adult     Postsurgical malabsorption     Weight gain status post gastric bypass     Past Surgical History:   Procedure Laterality Date      SECTION  , 2001    x 2     LAPAROSCOPIC BYPASS GASTRIC N/A 3/9/2016    Procedure: LAPAROSCOPIC BYPASS GASTRIC;  Surgeon: Antoine Alvarez MD;  Location:  OR     LAPAROSCOPIC HERNIORRHAPHY HIATAL N/A 3/9/2016    Procedure: LAPAROSCOPIC HERNIORRHAPHY HIATAL;  Surgeon: Antoine Alvarez MD;  Location:  OR       Social History     Tobacco Use     Smoking status: Never Smoker     Smokeless tobacco: Never Used   Substance Use Topics     Alcohol use: No     Family History   Problem Relation Age of Onset     Diabetes Father      Obesity Father      Diabetes Mother      Hypertension Mother      Hyperlipidemia Mother      Depression Mother      Anxiety Disorder Mother      Obesity Mother      Breast Cancer Mother 42        Double mastectomy     Anesthesia Reaction No family hx of          Current Outpatient Medications   Medication Sig Dispense Refill     Calcium-Phosphorus-Vitamin D (CITRACAL +D3 PO) Take 600 mg by mouth 2 times daily        levonorgestrel (MIRENA) 20 MCG/24HR IUD 1 each (20 mcg) by Intrauterine route once for 1 dose 1 each 0     multivitamin  peds with iron (FLINTSTONES COMPLETE) 60 MG chewable tablet Take 2 chew tab by mouth daily (with lunch)        Omega-3 Fatty Acids (FISH OIL PO)        vitamin (B COMPLEX-C) tablet Take 1 tablet by mouth daily       No Known Allergies    FSH-7:   Breast  CA Risk Assessment (FHS-7) 2021   Did any of your first-degree relatives have breast or ovarian cancer? Yes   Did any of your relatives have bilateral breast cancer? No   Did any man in your family have breast cancer? No   Did any woman in your family have breast and ovarian cancer? Yes   Did any woman in your family have breast cancer before age 50 y? Yes   Do you have 2 or more relatives with breast and/or ovarian cancer? No   Do you have 2 or more relatives with breast and/or bowel cancer? No         Pertinent mammograms are reviewed under the imaging tab.  History of abnormal Pap smear: NO - age 30-65 PAP every 5 years with negative HPV co-testing recommended  PAP / HPV Latest Ref Rng & Units 2017   PAP - NIL   HPV 16 DNA NEG:Negative Negative   HPV 18 DNA NEG:Negative Negative   OTHER HR HPV NEG:Negative Negative     Reviewed and updated as needed this visit by clinical staff  Tobacco  Allergies  Meds              Reviewed and updated as needed this visit by Provider                  Past Medical History:   Diagnosis Date     Family history of breast cancer in mother      Hypertension     resolved with gastric bypass     IUD (intrauterine device) in place     Kyleena placed in 3/2018     Morbid obesity (H)     in the process of undergoing bariatric surgery     Type 2 diabetes mellitus (H)     resolved after Gastric bypass      Past Surgical History:   Procedure Laterality Date      SECTION  , 2001    x 2     LAPAROSCOPIC BYPASS GASTRIC N/A 3/9/2016    Procedure: LAPAROSCOPIC BYPASS GASTRIC;  Surgeon: Antoine Alvarez MD;  Location:  OR     LAPAROSCOPIC HERNIORRHAPHY HIATAL N/A 3/9/2016    Procedure: LAPAROSCOPIC HERNIORRHAPHY HIATAL;  Surgeon: Antoine Alvarez MD;  Location: [facility] OR     OB History    Para Term  AB Living   4 0 0 0 2 2   SAB TAB Ectopic Multiple Live Births   0 2 0 0 2      # Outcome Date GA Lbr Branden/2nd Weight Sex Delivery Anes PTL Lv   4  "      -SEC   KELLEY   3       -SEC   KELLEY   2 TAB            1 TAB                ROS:  CONSTITUTIONAL: NEGATIVE for fever, chills, change in weight  INTEGUMENTARU/SKIN: NEGATIVE for worrisome rashes, moles or lesions  EYES: NEGATIVE for vision changes or irritation  ENT: NEGATIVE for ear, mouth and throat problems  RESP: NEGATIVE for significant cough or SOB  BREAST: NEGATIVE for masses, tenderness or discharge  CV: NEGATIVE for chest pain, palpitations or peripheral edema  GI: NEGATIVE for nausea, abdominal pain, heartburn, or change in bowel habits  : NEGATIVE for unusual urinary or vaginal symptoms. Periods are regular.  MUSCULOSKELETAL: NEGATIVE for significant arthralgias or myalgia  NEURO: NEGATIVE for weakness, dizziness or paresthesias  PSYCHIATRIC: NEGATIVE for changes in mood or affect    OBJECTIVE:   /85   Pulse 83   Temp 98  F (36.7  C) (Tympanic)   Resp 14   Ht 1.575 m (5' 2\")   Wt 67.3 kg (148 lb 6 oz)   LMP  (LMP Unknown)   SpO2 99%   Breastfeeding No   BMI 27.14 kg/m    EXAM:  GENERAL: healthy, alert and no distress  EYES: Eyes grossly normal to inspection, PERRL and conjunctivae and sclerae normal  HENT: ear canals and TM's normal, nose and mouth without ulcers or lesions  NECK: no adenopathy, no asymmetry, masses, or scars and thyroid normal to palpation  RESP: lungs clear to auscultation - no rales, rhonchi or wheezes  BREAST: normal without masses, tenderness or nipple discharge and no palpable axillary masses or adenopathy  CV: regular rate and rhythm, normal S1 S2, no S3 or S4, no murmur, click or rub, no peripheral edema and peripheral pulses strong  ABDOMEN: soft, nontender, no hepatosplenomegaly, no masses and bowel sounds normal   (female): normal female external genitalia, normal urethral meatus, vaginal mucosa pink, moist, well rugated, and normal cervix/adnexa/uterus without masses, brownish discharge. IUD strings visualized. Pap smear done. "   MS: no gross musculoskeletal defects noted, no edema  SKIN: no suspicious lesions or rashes  NEURO: Normal strength and tone, mentation intact and speech normal  PSYCH: mentation appears normal, affect normal/bright    Diagnostic Test Results:  Recent labs reviewed with patient.   Component      Latest Ref Rng & Units 4/15/2021   Sodium      133 - 144 mmol/L 135   Potassium      3.4 - 5.3 mmol/L 3.7   Chloride      94 - 109 mmol/L 104   Carbon Dioxide      20 - 32 mmol/L 29   Anion Gap      3 - 14 mmol/L 2 (L)   Glucose      70 - 99 mg/dL 85   Urea Nitrogen      7 - 30 mg/dL 17   Creatinine      0.52 - 1.04 mg/dL 0.66   GFR Estimate      >60 mL/min/1.73:m2 >90   GFR Estimate If Black      >60 mL/min/1.73:m2 >90   Calcium      8.5 - 10.1 mg/dL 8.8   Bilirubin Total      0.2 - 1.3 mg/dL 0.6   Albumin      3.4 - 5.0 g/dL 3.9   Protein Total      6.8 - 8.8 g/dL 7.3   Alkaline Phosphatase      40 - 150 U/L 37 (L)   ALT      0 - 50 U/L 26   AST      0 - 45 U/L 15   Cholesterol      <200 mg/dL 154   Triglycerides      <150 mg/dL 59   HDL Cholesterol      >49 mg/dL 83   LDL Cholesterol Calculated      <100 mg/dL 59   Non HDL Cholesterol      <130 mg/dL 71   Creatinine Urine      mg/dL 215   Albumin Urine mg/L      mg/L 12   Albumin Urine mg/g Cr      0 - 25 mg/g Cr 5.58   TSH      0.40 - 4.00 mU/L 1.63   HIV Antigen Antibody Combo      NR:Nonreactive     Nonreactive   Hepatitis C Antibody      NR:Nonreactive Nonreactive   Hemoglobin A1C      0 - 5.6 % 5.0       ASSESSMENT/PLAN:     Monica was seen today for physical.    Diagnoses and all orders for this visit:    Routine general medical examination at a health care facility    Cervical cancer screening  -     HPV High Risk Types DNA Cervical  -     Pap imaged thin layer screen with HPV - recommended age 30 - 65 years (select HPV order below)    Encounter for screening mammogram for breast cancer  -     MA Screen Bilateral w/Salo; Future    Family history of malignant  "neoplasm of breast  -     CANCER RISK MGMT/CANCER GENETIC COUNSELING REFERRAL    Bariatric surgery status  5 years s/p gastric bypass.        -    Following with the Weight loss clinic. Routine annual labs recently completed. See Epic for details.       Patient has been advised of split billing requirements and indicates understanding: Yes  COUNSELING:   Reviewed preventive health counseling, as reflected in patient instructions       Regular exercise       Healthy diet/nutrition    Estimated body mass index is 27.14 kg/m  as calculated from the following:    Height as of this encounter: 1.575 m (5' 2\").    Weight as of this encounter: 67.3 kg (148 lb 6 oz).    Weight management plan: Discussed healthy diet and exercise guidelines    She reports that she has never smoked. She has never used smokeless tobacco.      Counseling Resources:  ATP IV Guidelines  Pooled Cohorts Equation Calculator  Breast Cancer Risk Calculator  BRCA-Related Cancer Risk Assessment: FHS-7 Tool  FRAX Risk Assessment  ICSI Preventive Guidelines  Dietary Guidelines for Americans, 2010  USDA's MyPlate  ASA Prophylaxis  Lung CA Screening    Follow up annually and as needed thoughout the year.    Shaila Flores MD  [facility]  "

## 2021-04-20 ASSESSMENT — ANXIETY QUESTIONNAIRES: GAD7 TOTAL SCORE: 7

## 2021-04-21 ENCOUNTER — OFFICE VISIT (OUTPATIENT)
Dept: FAMILY MEDICINE | Facility: CLINIC | Age: 46
End: 2021-04-21
Payer: COMMERCIAL

## 2021-04-21 VITALS
TEMPERATURE: 98 F | DIASTOLIC BLOOD PRESSURE: 85 MMHG | HEIGHT: 62 IN | BODY MASS INDEX: 27.3 KG/M2 | SYSTOLIC BLOOD PRESSURE: 128 MMHG | WEIGHT: 148.38 LBS | HEART RATE: 83 BPM | RESPIRATION RATE: 14 BRPM | OXYGEN SATURATION: 99 %

## 2021-04-21 DIAGNOSIS — Z12.4 CERVICAL CANCER SCREENING: ICD-10-CM

## 2021-04-21 DIAGNOSIS — Z98.84 BARIATRIC SURGERY STATUS: ICD-10-CM

## 2021-04-21 DIAGNOSIS — Z00.00 ROUTINE GENERAL MEDICAL EXAMINATION AT A HEALTH CARE FACILITY: Primary | ICD-10-CM

## 2021-04-21 DIAGNOSIS — Z80.3 FAMILY HISTORY OF MALIGNANT NEOPLASM OF BREAST: ICD-10-CM

## 2021-04-21 DIAGNOSIS — Z12.31 ENCOUNTER FOR SCREENING MAMMOGRAM FOR BREAST CANCER: ICD-10-CM

## 2021-04-21 PROCEDURE — 99396 PREV VISIT EST AGE 40-64: CPT | Performed by: FAMILY MEDICINE

## 2021-04-21 PROCEDURE — 87624 HPV HI-RISK TYP POOLED RSLT: CPT | Performed by: FAMILY MEDICINE

## 2021-04-21 PROCEDURE — G0145 SCR C/V CYTO,THINLAYER,RESCR: HCPCS | Performed by: FAMILY MEDICINE

## 2021-04-21 ASSESSMENT — MIFFLIN-ST. JEOR: SCORE: 1276.27

## 2021-04-26 LAB
COPATH REPORT: NORMAL
PAP: NORMAL

## 2021-04-28 LAB
FINAL DIAGNOSIS: NORMAL
HPV HR 12 DNA CVX QL NAA+PROBE: NEGATIVE
HPV16 DNA SPEC QL NAA+PROBE: NEGATIVE
HPV18 DNA SPEC QL NAA+PROBE: NEGATIVE
SPECIMEN DESCRIPTION: NORMAL
SPECIMEN SOURCE CVX/VAG CYTO: NORMAL

## 2021-05-18 ENCOUNTER — VIRTUAL VISIT (OUTPATIENT)
Dept: ONCOLOGY | Facility: CLINIC | Age: 46
End: 2021-05-18
Attending: FAMILY MEDICINE
Payer: COMMERCIAL

## 2021-05-18 DIAGNOSIS — Z80.3 FAMILY HISTORY OF MALIGNANT NEOPLASM OF BREAST: Primary | ICD-10-CM

## 2021-05-18 DIAGNOSIS — Z80.8 FAMILY HISTORY OF MALIGNANT MELANOMA: ICD-10-CM

## 2021-05-18 PROCEDURE — 96040 HC GENETIC COUNSELING, EACH 30 MINUTES: CPT | Mod: 95 | Performed by: GENETIC COUNSELOR, MS

## 2021-05-18 NOTE — PROGRESS NOTES
5/18/2021    Referring Provider: Shaila Flores MD    Presenting Information:   I met with Monica Walter today for genetic counseling at the Cancer Risk Management Program (virtual visit) to discuss her family history of breast cancer and melanoma.  She is here today to review this history, cancer screening recommendations, and available genetic testing options.    Personal History:  Monica is a 45 year old female. She does not have any personal history of cancer.  She had her first menstrual period at age 11, and has her ovaries, fallopian tubes and uterus in place.  She has two children, ages 21 and 19.  Her most recent mammogram from 1/13/2020 was negative, and she has scattered fibroglandular breast densities.      Family History: (Please see scanned pedigree for detailed family history information)    Her mother was diagnosed with breast cancer at age 50    Three maternal first cousins were diagnosed with melanoma    Her maternal grandmother was diagnosed with a gynecologic cancer (possibly ovarian) and passed away in her 60's    Discussion:    Monica's family history of breast, gynecologic cancer and melanoma may be suggestive of a possible hereditary cancer syndrome.    We reviewed the features of sporadic, familial, and hereditary cancers. The vast majority of cancers are considered sporadic and not primarily due to an inherited factor.  Individuals can develop cancer due to aging, chance events, environmental exposures or lifestyles.  Mainly inherited factors (altered cancer susceptibility genes) are responsible for only a small number of cancer cases, approximately 5-10%.    We discussed the natural history and genetics of Hereditary Breast and Ovarian Cancer syndrome, caused by mutations in the BRCA1/2 genes. A detailed handout regarding hereditary breast and gynecologic cancers and the information we discussed can be found in the after visit summary. Topics included: inheritance pattern, cancer  risks, cancer screening recommendations, and also risks, benefits and limitations of testing.    We discussed that there are additional genes that could cause increased risk for breast and gynecologic cancer. As many of these genes present with overlapping features in a family and accurate cancer risk cannot always be established based upon the pedigree analysis alone, it would be reasonable for Monica to consider panel genetic testing to analyze multiple genes at once.    We discussed that genetic testing for breast cancer susceptibility genes is typically most informative, though, when it is first performed on a family member with a personal history of  breast cancer. In this situation, we discussed that Monica's mother would be the best person to test first. Testing is available to Monica, but with limitations. If Monica pursues testing at this time and receives a negative result, this does not rule out the possibility of a hereditary cancer syndrome in her and/or her family.     Monica may have some increased risk for breast cancer given her family history. Breast cancer screening is generally recommended to begin approximately 10 years younger than the earliest age of breast cancer diagnosis in the family, or at age 40, whichever comes first. In this family, screening may begin at age 40. Breast screening options should be discussed with an individual's primary care provider and a genetic counselor, to determine at what age to begin screening, what screening is appropriate, and if additional screening (such as breast MRI) is necessary based on personal/family history factors.  These recommendations may change should genetic testing be completed    Plan:  1) No testing was completed at this time  2) Monica plans to contact me should she decide to proceed with a prior authorization/genetic testing.      Approximate time spent over phone, Face to face time: 25 minutes    Jessica Lin MS, [facility] [place]  Licensed, Certified  Genetic Counselor  [facility]  Phone: [phone]

## 2021-05-18 NOTE — LETTER
5/18/2021         RE: Monica Walter  [address] [place] [id number]        Dear Colleague,    Thank you for referring your patient, Monica Walter, to the [facility]. Please see a copy of my visit note below.    5/18/2021    Referring Provider: Shaila Flores MD    Presenting Information:   I met with Monica Walter today for genetic counseling at the Cancer Risk Management Program (virtual visit) to discuss her family history of breast cancer and melanoma.  She is here today to review this history, cancer screening recommendations, and available genetic testing options.    Personal History:  Monica is a 45 year old female. She does not have any personal history of cancer.  She had her first menstrual period at age 11, and has her ovaries, fallopian tubes and uterus in place.  She has two children, ages 21 and 19.  Her most recent mammogram from 1/13/2020 was negative, and she has scattered fibroglandular breast densities.      Family History: (Please see scanned pedigree for detailed family history information)    Her mother was diagnosed with breast cancer at age 50    Three maternal first cousins were diagnosed with melanoma    Her maternal grandmother was diagnosed with a gynecologic cancer (possibly ovarian) and passed away in her 60's    Discussion:    Monica's family history of breast, gynecologic cancer and melanoma may be suggestive of a possible hereditary cancer syndrome.    We reviewed the features of sporadic, familial, and hereditary cancers. The vast majority of cancers are considered sporadic and not primarily due to an inherited factor.  Individuals can develop cancer due to aging, chance events, environmental exposures or lifestyles.  Mainly inherited factors (altered cancer susceptibility genes) are responsible for only a small number of cancer cases, approximately 5-10%.    We discussed the natural history and genetics of  Hereditary Breast and Ovarian Cancer syndrome, caused by mutations in the BRCA1/2 genes. A detailed handout regarding hereditary breast and gynecologic cancers and the information we discussed can be found in the after visit summary. Topics included: inheritance pattern, cancer risks, cancer screening recommendations, and also risks, benefits and limitations of testing.    We discussed that there are additional genes that could cause increased risk for breast and gynecologic cancer. As many of these genes present with overlapping features in a family and accurate cancer risk cannot always be established based upon the pedigree analysis alone, it would be reasonable for Monica to consider panel genetic testing to analyze multiple genes at once.    We discussed that genetic testing for breast cancer susceptibility genes is typically most informative, though, when it is first performed on a family member with a personal history of  breast cancer. In this situation, we discussed that Monica's mother would be the best person to test first. Testing is available to Monica, but with limitations. If Monica pursues testing at this time and receives a negative result, this does not rule out the possibility of a hereditary cancer syndrome in her and/or her family.     Monica may have some increased risk for breast cancer given her family history. Breast cancer screening is generally recommended to begin approximately 10 years younger than the earliest age of breast cancer diagnosis in the family, or at age 40, whichever comes first. In this family, screening may begin at age 40. Breast screening options should be discussed with an individual's primary care provider and a genetic counselor, to determine at what age to begin screening, what screening is appropriate, and if additional screening (such as breast MRI) is necessary based on personal/family history factors.  These recommendations may change should genetic testing be  completed    Plan:  1) No testing was completed at this time  2) Monica plans to contact me should she decide to proceed with a prior authorization/genetic testing.      Approximate time spent over phone, Face to face time: 25 minutes    Jessica Lin MS, [facility] [place]  Licensed, Certified Genetic Counselor  [facility]  Phone: [phone]                Again, thank you for allowing me to participate in the care of your patient.        Sincerely,        Jessica Lin, GC

## 2021-05-19 NOTE — PATIENT INSTRUCTIONS
Assessing Cancer Risk  Only about 5-10% of cancers are thought to be due to an inherited cancer susceptibility gene.    These families often have:    Several people with the same or related types of cancer    Cancers diagnosed at a young age (before age 50)    Individuals with more than one primary cancer    Multiple generations of the family affected with cancer    Some people may be candidates for genetic testing of more than one gene.  For these families, genetic testing using a cancer panel may be offered.  These panels will test different genes known to increase the risk for breast, ovarian, uterine, and/or other cancers. All of the genes discussed below have published clinical management guidelines for individuals who are found to carry a mutation. The purpose of this handout is to serve as a brief summary of the genes analyzed by the panels used to inquire about hereditary breast and gynecologic cancer:  STEFFI, BRCA1, BRCA2, BRIP1, CDH1, CHEK2, MLH1, MSH2, MSH6, PMS2, EPCAM, PTEN, PALB2, RAD51C, RAD51D, and TP53.  ______________________________________________________________________________  Hereditary Breast and Ovarian Cancer Syndrome   (BRCA1 and BRCA2)  A single mutation in one of the copies of BRCA1 or BRCA2 increases the risk for breast and ovarian cancer, among others.  The risk for pancreatic cancer and melanoma may also be slightly increased in some families.  The chart below shows the chance that someone with a BRCA mutation would develop cancer in his or her lifetime1,2,3,4.        A person s ethnic background is also important to consider, as individuals of Ashkenazi [place] ancestry have a higher chance of having a BRCA gene mutation.  There are three BRCA mutations that occur more frequently in this population.    Chiu Syndrome   (MLH1, MSH2, MSH6, PMS2, and EPCAM)  Currently five genes are known to cause Chiu Syndrome: MLH1, MSH2, MSH6, PMS2, and EPCAM.  A single mutation in one of the  Chiu Syndrome genes increases the risk for colon, endometrial, ovarian, and stomach cancers.  Other cancers that occur less commonly in Chiu Syndrome include urinary tract, skin, and brain cancers.  The chart below shows the chance that a person with Chiu syndrome would develop cancer in his or her lifetime5.      *Cancer risk varies depending on Chiu syndrome gene found    Cowden Syndrome   (PTEN)  Cowden syndrome is a hereditary condition that increases the risk for breast, thyroid, endometrial, colon, and kidney cancer.  Cowden syndrome is caused by a mutation in the PTEN gene.  A single mutation in one of the copies of PTEN causes Cowden syndrome and increases cancer risk.  The chart below shows the chance that someone with a PTEN mutation would develop cancer in their lifetime6,7.  Other benign features seen in some individuals with Cowden syndrome include benign skin lesions (facial papules, keratoses, lipomas), learning disability, autism, thyroid nodules, colon polyps, and larger head size.      *One recent study found breast cancer risk to be increased to 85%    Li-Fraumeni Syndrome   (TP53)  Li-Fraumeni Syndrome (LFS) is a cancer predisposition syndrome caused by a mutation in the TP53 gene. A single mutation in one of the copies of TP53 increases the risk for multiple cancers. Individuals with LFS are at an increased risk for developing cancer at a young age. The lifetime risk for development of a LFS-associated cancer is 50% by age 30 and 90% by age 60.   Core Cancers: Sarcomas, Breast, Brain, Lung, Leukemias/Lymphomas, Adrenocortical carcinomas  Other Cancers: Gastrointestinal, Thyroid, Skin, Genitourinary    Hereditary Diffuse Gastric Cancer   (CDH1)  Currently, one gene is known to cause hereditary diffuse gastric cancer (HDGC): CDH1.  Individuals with HDGC are at increased risk for diffuse gastric cancer and lobular breast cancer. Of people diagnosed with HDGC, 30-50% have a mutation in the CDH1  gene.  This suggests there are likely other genes that may cause HDGC that have not been identified yet.      Lifetime Cancer Risks    General Population HDGC    Diffuse Gastric  <1% ~80%   Breast 12% 39-52%         Additional Genes  STEFFI  STEFFI is a moderate-risk breast cancer gene. Women who have a mutation in STEFFI can have between a 2-4 fold increased risk for breast cancer compared to the general population8. STEFFI mutations have also been associated with increased risk for pancreatic cancer, however an estimate of this cancer risk is not well understood9. Individuals who inherit two STEFFI mutations have a condition called ataxia-telangiectasia (AT).  This rare autosomal recessive condition affects the nervous system and immune system, and is associated with progressive cerebellar ataxia beginning in childhood.  Individuals with ataxia-telangiectasia often have a weakened immune system and have an increased risk for childhood cancers.    PALB2  Mutations in PALB2 have been shown to increase the risk of breast cancer up to 33-58% in some families; where individuals fall within this risk range is dependent upon family [id number]. PALB2 mutations have also been associated with increased risk for pancreatic cancer, although this risk has not been quantified yet.  Individuals who inherit two PALB2 mutations--one from their mother and one from their father--have a condition called Fanconi Anemia.  This rare autosomal recessive condition is associated with short stature, developmental delay, bone marrow failure, and increased risk for childhood cancers.    CHEK2   CHEK2 is a moderate-risk breast cancer gene.  Women who have a mutation in CHEK2 have around a 2-fold increased risk for breast cancer compared to the general population, and this risk may be higher depending upon family history.11,12,13 Mutations in CHEK2 have also been shown to increase the risk of a number of other cancers, including colon and prostate, however  these cancer risks are currently not well understood.    BRIP1, RAD51C and RAD51D  Mutations in BRIP1, RAD51C, and RAD51D have been shown to increase the risk of ovarian cancer and possibly female breast cancer as well14,15 .       Lifetime Cancer Risk    General Population BRIP1 RAD51C RAD51D   Ovarian 1-2% ~5-8% ~5-9% ~7-15%           Inheritance  All of the cancer syndromes reviewed above are inherited in an autosomal dominant pattern.  This means that if a parent has a mutation, each of his or her children will have a 50% chance of inheriting that same mutation.  Therefore, each child--male or female--would have a 50% chance of being at increased risk for developing cancer.      Image obtained from Genetics Home Reference, 2013     Mutations in some genes can occur de jamil, which means that a person s mutation occurred for the first time in them and was not inherited from a parent.  Now that they have the mutation, however, it can be passed on to future generations.    Genetic Testing  Genetic testing involves a blood test and will look at the genetic information in the STEFFI, BRCA1, BRCA2, BRIP1, CDH1, CHEK2, MLH1, MSH2, MSH6, PMS2, EPCAM, PTEN, PALB2, RAD51C, RAD51D, and TP53 genes for any harmful mutations that are associated with increased cancer risk.  If possible, it is recommended that the person(s) who has had cancer be tested before other family members.  That person will give us the most useful information about whether or not a specific gene is associated with the cancer in the family.    Results  There are three possible results of genetic testing:    Positive--a harmful mutation was identified in one or more of the genes    Negative--no mutation was identified in any of the genes on this panel    Variant of unknown significance--a variation in one of the genes was identified, but it is unclear how this impacts cancer risk in the family    Advantages and Disadvantages   There are advantages and  disadvantages to genetic testing.    Advantages    May clarify your cancer risk    Can help you make medical decisions    May explain the cancers in your family    May give useful information to your family members (if you share your results)    Disadvantages    Possible negative emotional impact of learning about inherited cancer risk    Uncertainty in interpreting a negative test result in some situations    Possible genetic discrimination concerns (see below)    Genetic Information Nondiscrimination Act (RADHA)  RADHA is a federal law that protects individuals from health insurance or employment discrimination based on a genetic test result alone.  Although rare, there are currently no legal discrimination protections in terms of life insurance, long term care, or disability insurances.  Visit the [employer] website to learn more.    Reducing Cancer Risk  All of the genes described above have nationally recognized cancer screening guidelines that would be recommended for individuals who test positive.  In addition to increased cancer screening, surgeries may be offered or recommended to reduce cancer risk.  Recommendations are based upon an individual s genetic test result as well as their personal and family history of cancer.    Questions to Think About Regarding Genetic Testing:    What effect will the test result have on me and my relationship with my family members if I have an inherited gene mutation?  If I don t have a gene mutation?    Should I share my test results, and how will my family react to this news, which may also affect them?    Are my children ready to learn new information that may one day affect their own health?    Hereditary Cancer Resources    FORCE: Facing Our Risk of Cancer Empowered facingourrisk.org   Bright Pink bebrightpink.org   Li-Fraumeni Syndrome Association lfsassociation.org   PTEN World PTENworld.com   No stomach for cancer, Inc.  nostomachforcancer.org   Stomach cancer relief network Scrnet.org   Collaborative Group of the Americas on Inherited Colorectal Cancer (CGA) cgaicc.com    Cancer Care cancercare.org   American Cancer Society (ACS) cancer.org   National Cancer [place] (NCI) cancer.gov     Please call us if you have any questions or concerns.   Cancer Risk Management Program [phone] ([phone])  ? Dejuan Catalan, MS, [facility] [phone]  ? Earlene Rojas, MS, [facility]  [phone]  ? Jessica Lin, MS, [facility]  [phone]  ? Zelda Valdez, MS, [facility] [phone]  ? Mary Patti, MS, [facility] [phone]  ? Georgiana Le, MS, [facility]  [phone]    References  1. Roman A, Holden PDP, Milla S, Jason CASTELLANOS, Edgard JE, Yevgeniy JL, Kevin N, Taz H, Dave O, Bo A, Scar B, Gera P, Manalaina S, Tyler DM, Schmitt N, Katie E, Elizabeth H, Chevy E, Abigail J, Groneliane J, Magdalena B, Orlin H, Thorlacius S, Eerola H, Vilma H, Jesse K, Grant OP. Average risks of breast and ovarian cancer associated with BRCA1 or BRCA2 mutations detected in case series unselected for family history: a combined analysis of 222 studies. Am J Hum Estephanie. 2003;72:1117-30.  2. Irina MARRERO, Marilee M, Jessica G.  BRCA1 and BRCA2 Hereditary Breast and Ovarian Cancer. Gene Reviews online. 2013.  3. David YC, Kelechi S, Jack G, Patel S. Breast cancer risk among male BRCA1 and BRCA2 mutation carriers. J Natl Cancer Inst. 2007;99:1811-4.  4. Preet SALAS, Carola I, Luis Felipe J, Paul E, Vita ER, Marian F. Risk of breast cancer in male BRCA2 carriers. J Med Estephanie. 2010;47:710-1.  5. National Comprehensive Cancer Network. Clinical practice guidelines in oncology, colorectal cancer screening. Available online (registration required). 2015.  6. Larry BANDA, Jacquelyn J, Mahi J, Jefferson LA, Jeannette ROLAND, Prasanth C. Lifetime cancer risks in individuals with germline PTEN mutations. Clin Cancer Res. 2012;18:400-7.  7. Heather LEÓN. Cowden Syndrome: A Critical Review of the  Clinical Literature. J Estephanie . 2009:18:13-27.  8. García A, Ramos D, Nancy S, Merly P, Jeanette T, Sarina M, Jorge B, Emeka H, Disha R, Lux K, Tomasa L, Preet DG, Tyler D, Randy DF, Blaine MR, The Breast Cancer Susceptibility Collaboration () & Arlene MARRERO. STEFFI mutations that cause ataxia-telangiectasia are breast cancer susceptibility alleles. Nature Genetics. 2006;38:873-875  9. Dangelo N , Rain Y, Radha J, Vasu L, Leona GM , Denys ML, Gallinger S, Kwong AG, Syngal S, Manuelito ML, Lennie J , Wang R, Melani SZ, Martine JR, Maxine VE, Sia M, Vojaja B, Jenny N, Bruce RH, Sridevi KW, and Rufino AP. STEFFI mutations in patients with hereditary pancreatic cancer. Cancer Discover. 2012;2:41-46  10. Roman PASCAL, et al. Breast-Cancer Risk in Families with Mutations in PALB2. NEJM. 2014; 371(6):497-506.  11. CHEK2 Breast Cancer Case-Control Consortium. CHEK2*1100delC and susceptibility to breast cancer: A collaborative analysis involving 10,860 breast cancer cases and 9,065 controls from 10 studies. Am J Hum Estephanie, 74 (2004), pp. 0577-0125  12. Onofre T, Jean Claude S, Annalisa K, et al. Spectrum of Mutations in BRCA1, BRCA2, CHEK2, and TP53 in Families at High Risk of Breast Cancer. TERRENCE. 2006;295(12):9590-2411.   13. Caron C, Emi D, Denzel A, et al. Risk of breast cancer in women with a CHEK2 mutation with and without a family history of breast cancer. J Clin Oncol. 2011;29:0003-1034.  14. John H, Des E, Johana SJ, et al. Contribution of germline mutations in the RAD51B, RAD51C, and RAD51D genes to ovarian cancer in the population. J Clin Oncol. 2015;33(26):4710-2398. Doi:10.1200/JCO.2015.61.2408.  15. Helen T, Sixto WHEELER, Betty P, et al. Mutations in BRIP1 confer high risk of ovarian cancer. Jessica Estephanie. 2011;43(11):8379-0121. doi:10.1038/ng.955.

## 2021-05-29 ENCOUNTER — RECORDS - HEALTHEAST (OUTPATIENT)
Dept: ADMINISTRATIVE | Facility: CLINIC | Age: 46
End: 2021-05-29

## 2021-05-30 ENCOUNTER — RECORDS - HEALTHEAST (OUTPATIENT)
Dept: ADMINISTRATIVE | Facility: CLINIC | Age: 46
End: 2021-05-30

## 2021-07-25 ENCOUNTER — HEALTH MAINTENANCE LETTER (OUTPATIENT)
Age: 46
End: 2021-07-25

## 2021-09-19 ENCOUNTER — HEALTH MAINTENANCE LETTER (OUTPATIENT)
Age: 46
End: 2021-09-19

## 2021-11-14 ENCOUNTER — HEALTH MAINTENANCE LETTER (OUTPATIENT)
Age: 46
End: 2021-11-14

## 2021-11-23 ENCOUNTER — VIRTUAL VISIT (OUTPATIENT)
Dept: SURGERY | Facility: CLINIC | Age: 46
End: 2021-11-23
Payer: COMMERCIAL

## 2021-11-23 VITALS — HEIGHT: 62 IN | WEIGHT: 165 LBS | BODY MASS INDEX: 30.36 KG/M2

## 2021-11-23 DIAGNOSIS — Z98.84 BARIATRIC SURGERY STATUS: Primary | ICD-10-CM

## 2021-11-23 DIAGNOSIS — E66.811 CLASS 1 OBESITY DUE TO EXCESS CALORIES WITH SERIOUS COMORBIDITY AND BODY MASS INDEX (BMI) OF 30.0 TO 30.9 IN ADULT: ICD-10-CM

## 2021-11-23 DIAGNOSIS — E66.09 CLASS 1 OBESITY DUE TO EXCESS CALORIES WITH SERIOUS COMORBIDITY AND BODY MASS INDEX (BMI) OF 30.0 TO 30.9 IN ADULT: ICD-10-CM

## 2021-11-23 PROCEDURE — 99213 OFFICE O/P EST LOW 20 MIN: CPT | Mod: 95 | Performed by: PHYSICIAN ASSISTANT

## 2021-11-23 ASSESSMENT — MIFFLIN-ST. JEOR: SCORE: 1346.69

## 2021-11-23 NOTE — PROGRESS NOTES
Monica is a 46 year old who is being evaluated via a billable video visit.      If the video visit is dropped, the invitation should be resent by: Text to cell phone: [phone]  Will anyone else be joining your video visit? No      Video-Visit Details    Type of service:  Video Visit    Video Start Time: 2:40    Video End Time: 2:56    29 minutes spent on the date of the encounter doing chart review, review of test results, patient visit and documentation       Originating Location (pt. Location): Home    Distant Location (provider location):  [facility] [place]     Platform used for Video Visit: [employer]          2021        Return Virtual Medical Weight Management Note       Monica Walter  MRN:  [id number]  :  1975      Dear Shaila Flores,    I had the pleasure of doing a virtual visit with your patient Monica Walter.  She is a 46 year old female who I am continuing to see for treatment of obesity.        INTERVAL HISTORY:  Patient is 5.5 years SP Gastric Bypass last seen 2021. She has moved to her cabin in [place] and started a new job. Is commuting to [facility] currently for training. She is commuting 1 hr 40 minutes one way. Feels like she is snacking in the car now.  3-4 mornings per week stopping at [place] and getting about 500 calories in her drink. This commuting is only short term and could be done in 3.5 weeks.      Patient is wondering about restarting phentermine.  She was taking a half tablet daily as needed from .  Is up a substantial amount of weight and feels that would be helpful. Had no side effects the last time she took. it       CURRENT WEIGHT:   165 lbs 0 oz    Wt Readings from Last 4 Encounters:   21 165 lb (74.8 kg)   21 148 lb 6 oz (67.3 kg)   21 143 lb (64.9 kg)   10/27/20 145 lb (65.8 kg)       BARIATRIC METRICS:  Current Weight: 165 lb (74.8 kg) (pt reported)  Body mass index is 30.18 kg/m .   Wt  "change since last visit (lbs): 22  Cumulative weight loss (lbs): 72      DIETARY HISTORY  Not done      Exercise: No because she is so tired.       ROS: 11/23/21  General  Fatigue: yes  Sleep Quality: good  HEENT  Hx of glaucoma: No  Vision changes: No  Cardiovascular  Chest Pain with Exertion: No  Palpitations: No  Hx of heart disease: No  Pulmonary  Shortness of breath at rest: No  Shortness of breath with exertion: No  Snoring: some  Gastrourinary  History of kidney stones: No  Psychiatric  Moods Stable: Yes  Anxiety: No  Depression : No  History of drug abuse: No  Endocrine  Polydipsia: No  Polyuria: No  Neurologic:  Hx of seizures: No  Hx of paresthesias No      MEDICATIONS:   Current Outpatient Medications   Medication     Calcium-Phosphorus-Vitamin D (CITRACAL +D3 PO)     levonorgestrel (MIRENA) 20 MCG/24HR IUD     multivitamin  peds with iron (FLINTSTONES COMPLETE) 60 MG chewable tablet     Omega-3 Fatty Acids (FISH OIL PO)     vitamin (B COMPLEX-C) tablet     No current facility-administered medications for this visit.       PE:  Ht 5' 2\" (1.575 m)   Wt 165 lb (74.8 kg)   BMI 30.18 kg/m    GENERAL: Healthy, alert and no distress  EYES: Eyes grossly normal to inspection.  No discharge or erythema, or obvious scleral/conjunctival abnormalities.  RESP: No audible wheeze, cough, or visible cyanosis.  No visible retractions or increased work of breathing.    SKIN: Visible skin clear. No significant rash, abnormal pigmentation or lesions.  NEURO: Cranial nerves grossly intact.  Mentation and speech appropriate for age.  PSYCH: Mentation appears normal, affect normal/bright, judgement and insight intact, normal speech and appearance well-groomed.        ASSESSMENT/PLAN:   1. Class 1 Obesity   Healthy habits to assist with further weight loss were discussed. Monica will restart phentermine.  She is first asked to get weight, blood pressure and pulse checked at her clinic in the AM and MyChart over. After review " will send #90 phentermine 37.5 mg tablets over.     Patient will also check blood pressure/pulse 7 days after starting.  If BP above 140/90 or pulse is greater than 100 will contact clinic.     She is also asked to stop the caffeineated beverage at [place]     2. Bariatric surgery status  S/P gastric bypass 3/9/2016 [facility]       Follow up in 3 months or at annual in March      Shu Mcclendon MS, PA-C

## 2021-11-24 DIAGNOSIS — E66.09 CLASS 1 OBESITY DUE TO EXCESS CALORIES WITH SERIOUS COMORBIDITY AND BODY MASS INDEX (BMI) OF 30.0 TO 30.9 IN ADULT: Primary | ICD-10-CM

## 2021-11-24 DIAGNOSIS — E66.811 CLASS 1 OBESITY DUE TO EXCESS CALORIES WITH SERIOUS COMORBIDITY AND BODY MASS INDEX (BMI) OF 30.0 TO 30.9 IN ADULT: Primary | ICD-10-CM

## 2021-11-24 RX ORDER — PHENTERMINE HYDROCHLORIDE 37.5 MG/1
TABLET ORAL
Qty: 90 TABLET | Refills: 0 | Status: SHIPPED | OUTPATIENT
Start: 2021-11-24

## 2022-06-26 ENCOUNTER — HEALTH MAINTENANCE LETTER (OUTPATIENT)
Age: 47
End: 2022-06-26

## 2022-08-21 ENCOUNTER — HEALTH MAINTENANCE LETTER (OUTPATIENT)
Age: 47
End: 2022-08-21

## 2022-11-21 ENCOUNTER — HEALTH MAINTENANCE LETTER (OUTPATIENT)
Age: 47
End: 2022-11-21

## 2023-01-13 ENCOUNTER — MEDICAL CORRESPONDENCE (OUTPATIENT)
Dept: HEALTH INFORMATION MANAGEMENT | Facility: CLINIC | Age: 48
End: 2023-01-13

## 2023-04-16 ENCOUNTER — HEALTH MAINTENANCE LETTER (OUTPATIENT)
Age: 48
End: 2023-04-16

## 2023-07-09 ENCOUNTER — HEALTH MAINTENANCE LETTER (OUTPATIENT)
Age: 48
End: 2023-07-09

## 2023-09-17 ENCOUNTER — HEALTH MAINTENANCE LETTER (OUTPATIENT)
Age: 48
End: 2023-09-17

## 2023-11-26 ENCOUNTER — HEALTH MAINTENANCE LETTER (OUTPATIENT)
Age: 48
End: 2023-11-26

## 2024-09-01 ENCOUNTER — HEALTH MAINTENANCE LETTER (OUTPATIENT)
Age: 49
End: 2024-09-01